# Patient Record
Sex: FEMALE | Race: WHITE | NOT HISPANIC OR LATINO | Employment: OTHER | URBAN - METROPOLITAN AREA
[De-identification: names, ages, dates, MRNs, and addresses within clinical notes are randomized per-mention and may not be internally consistent; named-entity substitution may affect disease eponyms.]

---

## 2018-12-06 ENCOUNTER — OFFICE VISIT CONVERTED (OUTPATIENT)
Dept: ORTHOPEDIC SURGERY | Facility: CLINIC | Age: 68
End: 2018-12-06
Attending: ORTHOPAEDIC SURGERY

## 2019-07-19 ENCOUNTER — HOSPITAL ENCOUNTER (OUTPATIENT)
Dept: URGENT CARE | Facility: CLINIC | Age: 69
Discharge: HOME | End: 2019-07-19
Attending: EMERGENCY MEDICINE

## 2019-12-05 ENCOUNTER — OFFICE VISIT CONVERTED (OUTPATIENT)
Dept: CARDIOLOGY | Facility: CLINIC | Age: 69
End: 2019-12-05
Attending: INTERNAL MEDICINE

## 2019-12-05 ENCOUNTER — CONVERSION ENCOUNTER (OUTPATIENT)
Dept: CARDIOLOGY | Facility: CLINIC | Age: 69
End: 2019-12-05

## 2019-12-09 ENCOUNTER — HOSPITAL ENCOUNTER (OUTPATIENT)
Dept: INFUSION THERAPY | Facility: HOSPITAL | Age: 69
Setting detail: HOSPITAL OUTPATIENT SURGERY
Discharge: HOME | End: 2019-12-09
Attending: INTERNAL MEDICINE

## 2019-12-09 LAB
ANION GAP SERPL CALC-SCNC: 15 MMOL/L (ref 8–19)
BASOPHILS # BLD AUTO: 0.06 10*3/UL (ref 0–0.2)
BASOPHILS NFR BLD AUTO: 1.4 % (ref 0–3)
BUN SERPL-MCNC: 12 MG/DL (ref 5–25)
BUN/CREAT SERPL: 17 {RATIO} (ref 6–20)
CALCIUM SERPL-MCNC: 9.8 MG/DL (ref 8.7–10.4)
CHLORIDE SERPL-SCNC: 105 MMOL/L (ref 99–111)
CONV ABS IMM GRAN: 0.01 10*3/UL (ref 0–0.2)
CONV CO2: 27 MMOL/L (ref 22–32)
CONV IMMATURE GRAN: 0.2 % (ref 0–1.8)
CREAT UR-MCNC: 0.72 MG/DL (ref 0.5–0.9)
DEPRECATED RDW RBC AUTO: 40 FL (ref 36.4–46.3)
EOSINOPHIL # BLD AUTO: 0.18 10*3/UL (ref 0–0.7)
EOSINOPHIL # BLD AUTO: 4.1 % (ref 0–7)
ERYTHROCYTE [DISTWIDTH] IN BLOOD BY AUTOMATED COUNT: 12.1 % (ref 11.7–14.4)
GFR SERPLBLD BASED ON 1.73 SQ M-ARVRAT: >60 ML/MIN/{1.73_M2}
GLUCOSE SERPL-MCNC: 122 MG/DL (ref 65–99)
HCT VFR BLD AUTO: 39.9 % (ref 37–47)
HGB BLD-MCNC: 12.7 G/DL (ref 12–16)
INR PPP: 1 (ref 2–3)
LYMPHOCYTES # BLD AUTO: 1.1 10*3/UL (ref 1–5)
LYMPHOCYTES NFR BLD AUTO: 25 % (ref 20–45)
MCH RBC QN AUTO: 28.9 PG (ref 27–31)
MCHC RBC AUTO-ENTMCNC: 31.8 G/DL (ref 33–37)
MCV RBC AUTO: 90.7 FL (ref 81–99)
MONOCYTES # BLD AUTO: 0.45 10*3/UL (ref 0.2–1.2)
MONOCYTES NFR BLD AUTO: 10.2 % (ref 3–10)
NEUTROPHILS # BLD AUTO: 2.6 10*3/UL (ref 2–8)
NEUTROPHILS NFR BLD AUTO: 59.1 % (ref 30–85)
NRBC CBCN: 0 % (ref 0–0.7)
OSMOLALITY SERPL CALC.SUM OF ELEC: 297 MOSM/KG (ref 273–304)
PLATELET # BLD AUTO: 314 10*3/UL (ref 130–400)
PMV BLD AUTO: 9.5 FL (ref 9.4–12.3)
POTASSIUM SERPL-SCNC: 3.7 MMOL/L (ref 3.5–5.3)
PROTHROMBIN TIME: 10.8 S (ref 9.4–12)
RBC # BLD AUTO: 4.4 10*6/UL (ref 4.2–5.4)
SODIUM SERPL-SCNC: 143 MMOL/L (ref 135–147)
WBC # BLD AUTO: 4.4 10*3/UL (ref 4.8–10.8)

## 2019-12-10 LAB
BASE EXCESS BLD CALC-SCNC: -2.7 MMOL/L
BASE EXCESS BLD CALC-SCNC: -5 MMOL/L
COHGB MFR BLD: 0.1 % (ref 0–1.5)
COHGB MFR BLD: 0.3 % (ref 0–1.5)
CONV FHHB: 21.4 % (ref 0–5)
CONV FHHB: 22.4 % (ref 0–5)
CONV SITE: ABNORMAL
CONV SITE: ABNORMAL
HBA1C MFR BLD: 12.6 % (ref 11.7–14.6)
HBA1C MFR BLD: 13.1 % (ref 11.7–14.6)
HCO3 BLDA-SCNC: 21.1 MMOL/L (ref 22–26)
HCO3 BLDA-SCNC: 23.5 MMOL/L (ref 22–26)
LITERS PER MINUTE: 2 L/MIN
LITERS PER MINUTE: 2 L/MIN
Lab: ABNORMAL
Lab: ABNORMAL
METHGB MFR BLD: 0.1 % (ref 0–1.5)
METHGB MFR BLD: 0.2 % (ref 0–1.5)
OXYHGB MFR BLD: 77.2 % (ref 94–98)
OXYHGB MFR BLD: 78.3 % (ref 94–98)
PCO2 BLD: 42.7 MM[HG] (ref 35–45)
PCO2 BLD: 46.1 MM[HG] (ref 35–45)
PH UR: 7.31 [PH] (ref 7.35–7.45)
PH UR: 7.32 [PH] (ref 7.35–7.45)
PO2 BLD: 44.9 MM[HG] (ref 80–100)
PO2 BLD: 45 MM[HG] (ref 80–100)
SAO2 % BLDCOA: 77.5 % (ref 95–99)
SAO2 % BLDCOA: 78.5 % (ref 95–99)
SPECIMEN SOURCE: ABNORMAL
SPECIMEN SOURCE: ABNORMAL

## 2020-02-17 ENCOUNTER — HOSPITAL ENCOUNTER (OUTPATIENT)
Dept: OTHER | Facility: HOSPITAL | Age: 70
Discharge: HOME | End: 2020-02-17
Attending: FAMILY MEDICINE

## 2020-11-24 ENCOUNTER — HOSPITAL ENCOUNTER (OUTPATIENT)
Dept: OTHER | Facility: HOSPITAL | Age: 70
Discharge: HOME | End: 2020-11-24
Attending: INTERNAL MEDICINE

## 2020-11-24 LAB
T4 FREE SERPL-MCNC: 0.6 NG/DL (ref 0.9–1.8)
TSH SERPL-ACNC: 10.78 M[IU]/L (ref 0.27–4.2)

## 2020-11-25 LAB — VIT B12 SERPL-MCNC: 522 PG/ML (ref 211–911)

## 2021-04-06 ENCOUNTER — OFFICE VISIT CONVERTED (OUTPATIENT)
Dept: PODIATRY | Facility: CLINIC | Age: 71
End: 2021-04-06
Attending: PODIATRIST

## 2021-04-29 ENCOUNTER — HOSPITAL ENCOUNTER (OUTPATIENT)
Dept: MRI IMAGING | Facility: HOSPITAL | Age: 71
Discharge: HOME | End: 2021-04-29
Attending: PODIATRIST

## 2021-04-29 LAB
CREAT BLD-MCNC: 0.7 MG/DL (ref 0.6–1.4)
GFR SERPLBLD BASED ON 1.73 SQ M-ARVRAT: >60 ML/MIN/{1.73_M2}

## 2021-05-14 VITALS
DIASTOLIC BLOOD PRESSURE: 75 MMHG | HEIGHT: 63 IN | SYSTOLIC BLOOD PRESSURE: 142 MMHG | BODY MASS INDEX: 28.17 KG/M2 | OXYGEN SATURATION: 94 % | TEMPERATURE: 97.6 F | HEART RATE: 66 BPM | WEIGHT: 159 LBS

## 2021-05-14 NOTE — PROGRESS NOTES
Progress Note      Patient Name: Gregoria Claire   Patient ID: [id number]   Sex: Female   YOB: 1950    Primary Care Provider: Waldo Stark MD   Referring Provider: Waldo Stark MD    Visit Date: April 6, 2021    Provider: Antonio Borjas DPM   [place] [facility]   Location Address: [address] [place] [address]  [id number]   Location Phone: [phone]          Chief Complaint  · Left Foot Pain  · Right Foot Pain      History Of Present Illness  Gregoria Claire is a 70 year old /White female who presents to the [facility] today new patient referred from Waldo Stark MD.      New, Established, New Problem: New  Location: Left second and third intermetatarsal spaces along with a right second toe overlapping hammertoe.  Duration: Greater than 1 year  Onset: Insidious  Nature: Sore, shooting  Stable, worsening, improving: Worsening  Aggravating factors:  Patient relates pain is aggravated by shoe gear and ambulation.    Previous Treatment: Patient relates multiple neuroma surgeries on both feet along with a right second hammertoe surgery.    Patient denies any fevers, chills, nausea, vomiting, shortness of breathe, nor any other constitutional signs nor symptoms.           Past Medical History  Corns and callus; Hammertoe; History of breast cancer; Trochanteric Bursitis, Bilateral Hips         Past Surgical History  Breast Surgery; Foot surgery; Tumor removal         Medication List  diclofenac sodium 75 mg oral tablet,delayed release (DR/EC); Effexor XR 37.5 mg oral capsule,extended release 24hr; levothyroxine 125 mcg oral capsule         Allergy List  NO KNOWN DRUG ALLERGIES         Family Medical History  Cancer, Unspecified; FH: lung cancer; FH: stomach cancer; FH: diabetes mellitus; FH: hepatitis         Social History  Alcohol (Never); Tobacco (Former)         Review of Systems  · Constitutional  o Denies  o : fatigue, night  "sweats  · Eyes  o Denies  o : double vision, blurred vision  · HENT  o Denies  o : vertigo, recent head injury  · Cardiovascular  o Denies  o : chest pain, irregular heart beats  · Respiratory  o Denies  o : shortness of breath, productive cough  · Gastrointestinal  o Denies  o : nausea, vomiting  · Genitourinary  o Denies  o : dysuria, urinary retention  · Integument  o Denies  o : hair growth change, new skin lesions  · Neurologic  o * See HPI  · Musculoskeletal  o * See HPI  · Endocrine  o Denies  o : cold intolerance, heat intolerance  · Heme-Lymph  o Denies  o : petechiae, lymph node enlargement or tenderness  · Allergic-Immunologic  o Denies  o : frequent illnesses      Vitals  Date Time BP Position Site L\R Cuff Size HR RR TEMP (F) WT  HT  BMI kg/m2 BSA m2 O2 Sat FR L/min FiO2 HC       04/06/2021 10:55 /75 Sitting    66 - R  97.6 159lbs 0oz 5'  3\" 28.17 1.79 94 %            Physical Examination  · Constitutional  o Appearance  o : well-nourished, well developed, appears to be chronically ill   · Cardiovascular  o Peripheral Vascular System  o :   § Pedal Pulses  § : 2+ and symmetrical   § Extremities  § : There is no edema of the lower extremities  · Musculoskeletal  o Extremeties/Joint  o : Lower extremity muscle strength and range of motion is equal and symmetrical bilaterally. The knees are noted to be in normal alignment. Right second overlapping second toe over the hallux. Otherwise, ankle alignment and range of motion is normal and foot structure is normal. Subtalar, metatarsal and metatarsal-phalangeal joint range of motion is noted to be within normal limits. The digits of both feet are in normal alignment. The gait is normal.   · Skin and Subcutaneous Tissue  o General Inspection  o : Skin is noted to have normal texture and turgor, with no excrescences noted.   o Digits and Nails  o : The toenails are noted to be without disese.  · Neurologic  o Sensation  o : There is pain on palpation at the " level of Tinels sign with Mulders sign to the left second and third intermetatarsal spaces. Well-healed incisions from previous neuroma surgeries.          Assessment  · Foot pain, left     729.5/M79.672  · Foot pain, right     729.5/M79.671  · Neuroma     215.9/D36.10  · Neuritis     729.2/M79.2      Plan  · Orders  o Lower Extremity (non-joint) with and without contrast (MRI) (63009) - - 04/29/2021  · Medications  o Medications have been Reconciled  o Transition of Care or Provider Policy  · Instructions  o Discuss Findings: I have discussed the findings of this evaluation with the patient. The discussion included a complete verbal explanation of any changes in the examination results, diagnosis, and the current treatment plan. A schedule for future care needs was explained. If any questions should arise after returning home, I have encouraged the patient to feel free to contact Dr. Borjas. The patient states understanding and agreement with this plan.  o Follow-up will be scheduled after MRI results are available. Depending on MRI results may refer to Dr. Haile Uriostegui.  o Patient to monitor for recurrence of symptoms and to contact Dr. Walker office for a follow-up appointment.  o Electronically Identified Patient Education Materials Provided Electronically  · Disposition  o Call or Return if symptoms worsen or persist.            Electronically Signed by: Antonio Borjas DPM -Author on April 29, 2021 02:23:40 PM

## 2021-05-15 VITALS
DIASTOLIC BLOOD PRESSURE: 76 MMHG | HEIGHT: 63 IN | WEIGHT: 155 LBS | HEART RATE: 54 BPM | SYSTOLIC BLOOD PRESSURE: 142 MMHG | BODY MASS INDEX: 27.46 KG/M2

## 2021-05-15 VITALS — HEIGHT: 63 IN

## 2021-08-11 ENCOUNTER — TRANSCRIBE ORDERS (OUTPATIENT)
Dept: ADMINISTRATIVE | Facility: HOSPITAL | Age: 71
End: 2021-08-11

## 2021-08-11 DIAGNOSIS — Z12.31 SCREENING MAMMOGRAM, ENCOUNTER FOR: Primary | ICD-10-CM

## 2022-03-08 ENCOUNTER — TRANSCRIBE ORDERS (OUTPATIENT)
Dept: ADMINISTRATIVE | Facility: HOSPITAL | Age: 72
End: 2022-03-08

## 2022-03-08 DIAGNOSIS — Z12.31 SCREENING MAMMOGRAM FOR BREAST CANCER: Primary | ICD-10-CM

## 2022-04-05 ENCOUNTER — APPOINTMENT (OUTPATIENT)
Dept: MAMMOGRAPHY | Facility: HOSPITAL | Age: 72
End: 2022-04-05

## 2022-04-14 ENCOUNTER — OFFICE VISIT (OUTPATIENT)
Dept: INTERNAL MEDICINE | Facility: CLINIC | Age: 72
End: 2022-04-14

## 2022-04-14 VITALS
DIASTOLIC BLOOD PRESSURE: 65 MMHG | RESPIRATION RATE: 14 BRPM | OXYGEN SATURATION: 96 % | HEIGHT: 62 IN | BODY MASS INDEX: 29.81 KG/M2 | WEIGHT: 162 LBS | TEMPERATURE: 96.4 F | SYSTOLIC BLOOD PRESSURE: 131 MMHG | HEART RATE: 69 BPM

## 2022-04-14 DIAGNOSIS — I10 PRIMARY HYPERTENSION: ICD-10-CM

## 2022-04-14 DIAGNOSIS — N30.00 ACUTE CYSTITIS WITHOUT HEMATURIA: ICD-10-CM

## 2022-04-14 DIAGNOSIS — Z12.31 ENCOUNTER FOR SCREENING MAMMOGRAM FOR MALIGNANT NEOPLASM OF BREAST: ICD-10-CM

## 2022-04-14 DIAGNOSIS — Z76.89 ESTABLISHING CARE WITH NEW DOCTOR, ENCOUNTER FOR: Primary | ICD-10-CM

## 2022-04-14 DIAGNOSIS — R73.01 IMPAIRED FASTING GLUCOSE: ICD-10-CM

## 2022-04-14 DIAGNOSIS — E03.9 HYPOTHYROIDISM, UNSPECIFIED TYPE: ICD-10-CM

## 2022-04-14 DIAGNOSIS — R30.0 DYSURIA: ICD-10-CM

## 2022-04-14 DIAGNOSIS — R00.2 PALPITATIONS: ICD-10-CM

## 2022-04-14 DIAGNOSIS — Z13.220 LIPID SCREENING: ICD-10-CM

## 2022-04-14 PROBLEM — Z85.3 HISTORY OF BREAST CANCER: Status: ACTIVE | Noted: 2022-04-14

## 2022-04-14 LAB
ALBUMIN SERPL-MCNC: 4.6 G/DL (ref 3.5–5.2)
ALBUMIN/GLOB SERPL: 1.6 G/DL
ALP SERPL-CCNC: 42 U/L (ref 39–117)
ALT SERPL W P-5'-P-CCNC: 14 U/L (ref 1–33)
ANION GAP SERPL CALCULATED.3IONS-SCNC: 10.9 MMOL/L (ref 5–15)
AST SERPL-CCNC: 15 U/L (ref 1–32)
BACTERIA UR QL AUTO: ABNORMAL /HPF
BASOPHILS # BLD AUTO: 0.05 10*3/MM3 (ref 0–0.2)
BASOPHILS NFR BLD AUTO: 0.9 % (ref 0–1.5)
BILIRUB SERPL-MCNC: 0.3 MG/DL (ref 0–1.2)
BILIRUB UR QL STRIP: ABNORMAL
BUN SERPL-MCNC: 10 MG/DL (ref 8–23)
BUN/CREAT SERPL: 13.9 (ref 7–25)
CALCIUM SPEC-SCNC: 9.9 MG/DL (ref 8.6–10.5)
CHLORIDE SERPL-SCNC: 103 MMOL/L (ref 98–107)
CHOLEST SERPL-MCNC: 182 MG/DL (ref 0–200)
CLARITY UR: ABNORMAL
CO2 SERPL-SCNC: 25.1 MMOL/L (ref 22–29)
COLOR UR: ABNORMAL
CREAT SERPL-MCNC: 0.72 MG/DL (ref 0.57–1)
DEPRECATED RDW RBC AUTO: 39.7 FL (ref 37–54)
EGFRCR SERPLBLD CKD-EPI 2021: 89.5 ML/MIN/1.73
EOSINOPHIL # BLD AUTO: 0.13 10*3/MM3 (ref 0–0.4)
EOSINOPHIL NFR BLD AUTO: 2.2 % (ref 0.3–6.2)
ERYTHROCYTE [DISTWIDTH] IN BLOOD BY AUTOMATED COUNT: 12.7 % (ref 12.3–15.4)
GLOBULIN UR ELPH-MCNC: 2.9 GM/DL
GLUCOSE SERPL-MCNC: 115 MG/DL (ref 65–99)
GLUCOSE UR STRIP-MCNC: NEGATIVE MG/DL
HBA1C MFR BLD: 5.6 % (ref 4.8–5.6)
HCT VFR BLD AUTO: 41.1 % (ref 34–46.6)
HDLC SERPL-MCNC: 53 MG/DL (ref 40–60)
HGB BLD-MCNC: 13.6 G/DL (ref 12–15.9)
HGB UR QL STRIP.AUTO: ABNORMAL
IMM GRANULOCYTES # BLD AUTO: 0.02 10*3/MM3 (ref 0–0.05)
IMM GRANULOCYTES NFR BLD AUTO: 0.3 % (ref 0–0.5)
KETONES UR QL STRIP: ABNORMAL
LDLC SERPL CALC-MCNC: 116 MG/DL (ref 0–100)
LDLC/HDLC SERPL: 2.17 {RATIO}
LEUKOCYTE ESTERASE UR QL STRIP.AUTO: ABNORMAL
LYMPHOCYTES # BLD AUTO: 0.87 10*3/MM3 (ref 0.7–3.1)
LYMPHOCYTES NFR BLD AUTO: 14.8 % (ref 19.6–45.3)
MCH RBC QN AUTO: 29.2 PG (ref 26.6–33)
MCHC RBC AUTO-ENTMCNC: 33.1 G/DL (ref 31.5–35.7)
MCV RBC AUTO: 88.2 FL (ref 79–97)
MONOCYTES # BLD AUTO: 0.52 10*3/MM3 (ref 0.1–0.9)
MONOCYTES NFR BLD AUTO: 8.9 % (ref 5–12)
NEUTROPHILS NFR BLD AUTO: 4.28 10*3/MM3 (ref 1.7–7)
NEUTROPHILS NFR BLD AUTO: 72.9 % (ref 42.7–76)
NITRITE UR QL STRIP: NEGATIVE
NRBC BLD AUTO-RTO: 0 /100 WBC (ref 0–0.2)
PH UR STRIP.AUTO: 5.5 [PH] (ref 5–8)
PLATELET # BLD AUTO: 369 10*3/MM3 (ref 140–450)
PMV BLD AUTO: 10 FL (ref 6–12)
POTASSIUM SERPL-SCNC: 4.1 MMOL/L (ref 3.5–5.2)
PROT SERPL-MCNC: 7.5 G/DL (ref 6–8.5)
PROT UR QL STRIP: ABNORMAL
RBC # BLD AUTO: 4.66 10*6/MM3 (ref 3.77–5.28)
RBC # UR STRIP: ABNORMAL /HPF
REF LAB TEST METHOD: ABNORMAL
SODIUM SERPL-SCNC: 139 MMOL/L (ref 136–145)
SP GR UR STRIP: >=1.03 (ref 1–1.03)
SQUAMOUS #/AREA URNS HPF: ABNORMAL /HPF
T4 FREE SERPL-MCNC: 2.15 NG/DL (ref 0.93–1.7)
TRIGL SERPL-MCNC: 71 MG/DL (ref 0–150)
TSH SERPL DL<=0.05 MIU/L-ACNC: 0.31 UIU/ML (ref 0.27–4.2)
UROBILINOGEN UR QL STRIP: ABNORMAL
VLDLC SERPL-MCNC: 13 MG/DL (ref 5–40)
WBC # UR STRIP: ABNORMAL /HPF
WBC NRBC COR # BLD: 5.87 10*3/MM3 (ref 3.4–10.8)
YEAST URNS QL MICRO: ABNORMAL /HPF

## 2022-04-14 PROCEDURE — 84443 ASSAY THYROID STIM HORMONE: CPT | Performed by: NURSE PRACTITIONER

## 2022-04-14 PROCEDURE — 84439 ASSAY OF FREE THYROXINE: CPT | Performed by: NURSE PRACTITIONER

## 2022-04-14 PROCEDURE — 80061 LIPID PANEL: CPT | Performed by: NURSE PRACTITIONER

## 2022-04-14 PROCEDURE — 81001 URINALYSIS AUTO W/SCOPE: CPT | Performed by: NURSE PRACTITIONER

## 2022-04-14 PROCEDURE — 87088 URINE BACTERIA CULTURE: CPT | Performed by: NURSE PRACTITIONER

## 2022-04-14 PROCEDURE — 83036 HEMOGLOBIN GLYCOSYLATED A1C: CPT | Performed by: NURSE PRACTITIONER

## 2022-04-14 PROCEDURE — 85025 COMPLETE CBC W/AUTO DIFF WBC: CPT | Performed by: NURSE PRACTITIONER

## 2022-04-14 PROCEDURE — 87186 SC STD MICRODIL/AGAR DIL: CPT | Performed by: NURSE PRACTITIONER

## 2022-04-14 PROCEDURE — 80053 COMPREHEN METABOLIC PANEL: CPT | Performed by: NURSE PRACTITIONER

## 2022-04-14 PROCEDURE — 87086 URINE CULTURE/COLONY COUNT: CPT | Performed by: NURSE PRACTITIONER

## 2022-04-14 PROCEDURE — 99204 OFFICE O/P NEW MOD 45 MIN: CPT | Performed by: NURSE PRACTITIONER

## 2022-04-14 RX ORDER — VENLAFAXINE HYDROCHLORIDE 225 MG/1
TABLET, EXTENDED RELEASE ORAL
COMMUNITY
Start: 2022-03-08 | End: 2022-04-25

## 2022-04-14 RX ORDER — LEVOTHYROXINE SODIUM 125 UG/1
CAPSULE ORAL
COMMUNITY
End: 2022-04-14

## 2022-04-14 RX ORDER — LAMOTRIGINE 150 MG/1
75 TABLET ORAL DAILY
COMMUNITY
Start: 2022-03-08 | End: 2022-10-18 | Stop reason: SDUPTHER

## 2022-04-14 RX ORDER — OXYBUTYNIN CHLORIDE 5 MG/1
5 TABLET ORAL 2 TIMES DAILY
Qty: 60 TABLET | Refills: 1 | Status: SHIPPED | OUTPATIENT
Start: 2022-04-14 | End: 2022-10-18 | Stop reason: SDUPTHER

## 2022-04-14 RX ORDER — LISINOPRIL 20 MG/1
TABLET ORAL
COMMUNITY
Start: 2022-03-08 | End: 2022-04-14

## 2022-04-14 RX ORDER — LEVOTHYROXINE SODIUM 0.12 MG/1
125 TABLET ORAL DAILY
COMMUNITY
Start: 2022-03-09 | End: 2022-06-11 | Stop reason: HOSPADM

## 2022-04-14 RX ORDER — VENLAFAXINE HYDROCHLORIDE 37.5 MG/1
37.5 CAPSULE, EXTENDED RELEASE ORAL DAILY
COMMUNITY
End: 2022-06-16 | Stop reason: SDUPTHER

## 2022-04-14 RX ORDER — NITROFURANTOIN 25; 75 MG/1; MG/1
100 CAPSULE ORAL 2 TIMES DAILY
Qty: 10 CAPSULE | Refills: 0 | Status: SHIPPED | OUTPATIENT
Start: 2022-04-14 | End: 2022-04-19

## 2022-04-14 NOTE — ASSESSMENT & PLAN NOTE
We will go ahead and refer back to cardiology, it has been 3 years since her last work-up.  We will follow along and assist as needed.

## 2022-04-14 NOTE — PROGRESS NOTES
Chief Complaint  Establish Care and Dysuria    Subjective         Gregoria Claire presents to [facility] for Establishment of care, follow-up for chronic conditions, concerns about palpitations and dysuria.    Patient transferring care from Dr. Stark in [facility], patient is wanting to be closer to home, she states that which difficult making the drive to [place] for her appointments.  Patient was not dissatisfied with care, just tired of the drive.  She had her last follow-up a few months ago, she reported that everything at that time was going well.    Patient does have hypertension, currently diet and exercise controlled.  She has been on medications in the past, but not presently on 1.  She was just slightly elevated today, however she reports good readings at home.    Patient also with hypothyroidism, does take levothyroxine 125 mcg daily, her dose was altered a few months ago, she has not had any repeat labs to assure normalization.  Would like that done today.    Patient also with depression, this has been a longstanding issue.  She has been on several different medications currently treated with lamotrigine and venlafaxine, which she states works well.    Patient is wanting lab work as well as order for mammogram as it is time for her to have that completed.    Patient has acute concerns today of urinary frequency, urgency, dysuria.  The symptoms have been going on for the last few days.  Patient does have longstanding issue of urinary incontinence, she did want to discuss treatment options for that.    Also acute concern of palpitations, she did have a very thorough work-up done in 2019 by Dr. Kulwinder Zuniga, there was some mild aortic tricuspid regurgitation noted, otherwise no significant findings.  She had been trialed on Toprol and Cardizem, but patient states that these have never really gotten any better.  She is wanting to reestablish with cardiology to further  "discuss.             Review of Systems    Objective   Vital Signs:   /65   Pulse 69   Temp 96.4 °F (35.8 °C) (Temporal)   Resp 14   Ht 157.5 cm (62\")   Wt 73.5 kg (162 lb)   SpO2 96%   BMI 29.63 kg/m²     Physical Exam  Vitals and nursing note reviewed.   Constitutional:       Appearance: Normal appearance.   HENT:      Head: Normocephalic and atraumatic.      Nose: Nose normal.   Eyes:      Pupils: Pupils are equal, round, and reactive to light.   Cardiovascular:      Rate and Rhythm: Normal rate and regular rhythm.   Pulmonary:      Effort: Pulmonary effort is normal.      Breath sounds: Normal breath sounds.   Neurological:      Mental Status: She is alert.   Psychiatric:         Mood and Affect: Mood normal.         Thought Content: Thought content normal.        Result Review :                   Diagnoses and all orders for this visit:    1. Establishing care with new doctor, encounter for (Primary)    2. Primary hypertension  Assessment & Plan:  Currently well controlled with no medication.  We will continue to monitor.    Orders:  -     Comprehensive Metabolic Panel  -     CBC & Differential    3. Hypothyroidism, unspecified type  Assessment & Plan:  We will check labs today, adjust dosing as needed.    Orders:  -     T4, Free  -     TSH    4. Lipid screening  -     Lipid Panel    5. Encounter for screening mammogram for malignant neoplasm of breast  -     Mammo Screening Digital Tomosynthesis Bilateral With CAD; Future    6. Palpitations  Assessment & Plan:  We will go ahead and refer back to cardiology, it has been 3 years since her last work-up.  We will follow along and assist as needed.    Orders:  -     Ambulatory Referral to Cardiology    7. Impaired fasting glucose  -     Hemoglobin A1c    8. Dysuria  -     Urinalysis With Culture If Indicated - Urine, Clean Catch  -     Urinalysis, Microscopic Only - Urine, Clean Catch  -     Urine Culture - Urine, Urine, Clean Catch    9. Acute cystitis " without hematuria  Assessment & Plan:  We will go ahead and treat with Macrobid, will culture urine, follow-up based on results.      Other orders  -     nitrofurantoin, macrocrystal-monohydrate, (MACROBID) 100 MG capsule; Take 1 capsule by mouth 2 (Two) Times a Day for 5 days.  Dispense: 10 capsule; Refill: 0  -     oxybutynin (DITROPAN) 5 MG tablet; Take 1 tablet by mouth 2 (Two) Times a Day. May cause dry mouth  Dispense: 60 tablet; Refill: 1        Follow Up   Return in about 3 months (around 7/14/2022) for Recheck.  Patient was given instructions and counseling regarding her condition or for health maintenance advice. Please see specific information pulled into the AVS if appropriate.     Patient when exiting the clinic after the visit did suffer a fall, she tripped on the curb, falling forward hitting her knees on the concrete and catching herself with her hands.  Patient did scrape the palmar surface of both hands and both knees.  Patient did not appear to hit her head.  Staff was able to assist her, they did bring her back into exam room where I saw the patient.  She did have 2 small open wounds to her left hand, dorsal surface.  She did have abrasions noted to bilateral palmar surfaces.  Both knees with abrasions.  She complains of right-sided musculoskeletal thoracic pain.  There was no acute injury seen, I was accompanied by Rula Gallagher, practice manager also acted as chaperone.  Patient states that she feels fine, she does not feel like she needs any x-rays or further work-up.  Rula was able to cleanse the wounds and apply Band-Aids.  The event has been documented.  If patient has any further issues or concerns advised to let us know, we will be happy to help in any way.  She was discharged in stable condition.    IAN Thomson  4/14/2022  This note was electronically signed.

## 2022-04-16 LAB — BACTERIA SPEC AEROBE CULT: ABNORMAL

## 2022-04-20 ENCOUNTER — APPOINTMENT (OUTPATIENT)
Dept: MAMMOGRAPHY | Facility: HOSPITAL | Age: 72
End: 2022-04-20

## 2022-04-25 ENCOUNTER — OFFICE VISIT (OUTPATIENT)
Dept: CARDIOLOGY | Facility: CLINIC | Age: 72
End: 2022-04-25

## 2022-04-25 VITALS
DIASTOLIC BLOOD PRESSURE: 96 MMHG | HEIGHT: 62 IN | SYSTOLIC BLOOD PRESSURE: 158 MMHG | HEART RATE: 63 BPM | WEIGHT: 160 LBS | BODY MASS INDEX: 29.44 KG/M2

## 2022-04-25 DIAGNOSIS — I10 PRIMARY HYPERTENSION: ICD-10-CM

## 2022-04-25 DIAGNOSIS — R06.09 DOE (DYSPNEA ON EXERTION): ICD-10-CM

## 2022-04-25 DIAGNOSIS — R00.2 PALPITATIONS: Primary | ICD-10-CM

## 2022-04-25 DIAGNOSIS — R42 LIGHTHEADEDNESS: ICD-10-CM

## 2022-04-25 PROCEDURE — 99214 OFFICE O/P EST MOD 30 MIN: CPT | Performed by: NURSE PRACTITIONER

## 2022-04-25 PROCEDURE — 93000 ELECTROCARDIOGRAM COMPLETE: CPT | Performed by: NURSE PRACTITIONER

## 2022-04-25 RX ORDER — TRAZODONE HYDROCHLORIDE 150 MG/1
75 TABLET ORAL NIGHTLY
COMMUNITY
End: 2022-06-16

## 2022-04-25 RX ORDER — LOVASTATIN 20 MG/1
20 TABLET ORAL
Qty: 90 TABLET | Refills: 1 | Status: SHIPPED | OUTPATIENT
Start: 2022-04-25

## 2022-04-25 RX ORDER — LISINOPRIL 2.5 MG/1
2.5 TABLET ORAL DAILY
Qty: 90 TABLET | Refills: 1 | Status: SHIPPED | OUTPATIENT
Start: 2022-04-25 | End: 2022-06-11 | Stop reason: HOSPADM

## 2022-04-25 NOTE — PROGRESS NOTES
"Chief Complaint  Shortness of Breath, Palpitations, Dizziness, and Fatigue    Subjective            History of Present Illness  Gregoria Claire is a 71-year-old white/ female patient who presents to the office today with complaint of increased palpitations, dizziness, fatigue, and shortness of breath with physical exertion.  She admits that this has been ongoing for the past 2 to 3 years but significantly worsened over the last few weeks.  She has noticed that she becomes more labored in her breathing with physical activity and is sleeping more during the daytime.  At times of more labored breathing is when she most notices the heart palpitations occurring.  She reports that she has history of \"valve regurgitation\".  She also reports history of syncope \"3 hospitalizations from passing out but 1 of those was because I had a UTI\".  She is not currently prescribed any medication for cholesterol, blood pressure, or heart rate control.  Most recent records have been requested from previous cardiologist.  Review of her chart shows 12/9/2019 cardiac catheterization with normal angiographically appearing coronary arteries, Echocardiogram on 5/10/2019 shows mild tricuspid regurgitation and ejection fraction 65%, and Holter monitor on 5/9/2019 shows occasional PACs and frequent PVCs with a few short runs of SVT.    PMH  Past Medical History:   Diagnosis Date   • Breast cancer (HCC)    • Depression    • Hypertension    • Hyperthyroidism          ALLERGY  No Known Allergies       SURGICALHX  Past Surgical History:   Procedure Laterality Date   • HYSTERECTOMY     • MASTECTOMY            SOC  Social History     Socioeconomic History   • Marital status:    Tobacco Use   • Smoking status: Never Smoker   • Smokeless tobacco: Never Used   Vaping Use   • Vaping Use: Never used   Substance and Sexual Activity   • Alcohol use: Never   • Drug use: Never   • Sexual activity: Defer         FAMHX  History reviewed. No " "pertinent family history.       MEDSIGONLY  Current Outpatient Medications on File Prior to Visit   Medication Sig   • lamoTRIgine (LaMICtal) 150 MG tablet Take  by mouth Daily. 1/2 tab qd   • levothyroxine (SYNTHROID, LEVOTHROID) 125 MCG tablet Take 125 mcg by mouth Daily.   • oxybutynin (DITROPAN) 5 MG tablet Take 1 tablet by mouth 2 (Two) Times a Day. May cause dry mouth   • traZODone (DESYREL) 75 MG half tablet Take 75 mg by mouth Every Night.   • venlafaxine XR (EFFEXOR-XR) 37.5 MG 24 hr capsule Effexor XR 37.5 mg oral capsule,extended release 24hr take 1 capsule (37.5 mg) by oral route once daily with food   Active   • [DISCONTINUED] venlafaxine 225 MG tablet sustained-release 24 hour 24 hr tablet      No current facility-administered medications on file prior to visit.         Objective   /96 (BP Location: Right arm, Patient Position: Sitting)   Pulse 63   Ht 157.5 cm (62\")   Wt 72.6 kg (160 lb)   BMI 29.26 kg/m²       Physical Exam  HENT:      Head: Normocephalic.   Neck:      Vascular: No carotid bruit.   Cardiovascular:      Rate and Rhythm: Normal rate and regular rhythm.      Pulses: Normal pulses.      Heart sounds: Murmur heard.   Pulmonary:      Effort: Pulmonary effort is normal.      Breath sounds: Normal breath sounds.   Musculoskeletal:      Cervical back: Neck supple.      Right lower leg: No edema.      Left lower leg: No edema.   Skin:     General: Skin is dry.      Capillary Refill: Capillary refill takes less than 2 seconds.   Neurological:      Mental Status: She is alert and oriented to person, place, and time.   Psychiatric:         Behavior: Behavior normal.       ECG 12 Lead    Date/Time: 4/25/2022 2:29 PM  Performed by: Lucila Stark APRN  Authorized by: Manfred Escalona MD   Comparison: compared with previous ECG   Similar to previous ECG  Rhythm: sinus rhythm  Rate: bradycardic  BPM: 59  Conduction: conduction normal  ST Segments: ST segments normal  T Waves: T " waves normal  QRS axis: normal  Other findings: T wave abnormality    Clinical impression: normal ECG          Result Review :   The following data was reviewed by: IAN Giordano on 04/25/2022:  No results found for: PROBNP  CMP    CMP 4/14/22   Glucose 115 (A)   BUN 10   Creatinine 0.72   Sodium 139   Potassium 4.1   Chloride 103   Calcium 9.9   Albumin 4.60   Total Bilirubin 0.3   Alkaline Phosphatase 42   AST (SGOT) 15   ALT (SGPT) 14   (A) Abnormal value            CBC w/diff    CBC w/Diff 4/14/22   WBC 5.87   RBC 4.66   Hemoglobin 13.6   Hematocrit 41.1   MCV 88.2   MCH 29.2   MCHC 33.1   RDW 12.7   Platelets 369   Neutrophil Rel % 72.9   Immature Granulocyte Rel % 0.3   Lymphocyte Rel % 14.8 (A)   Monocyte Rel % 8.9   Eosinophil Rel % 2.2   Basophil Rel % 0.9   (A) Abnormal value             Lab Results   Component Value Date    TSH 0.306 04/14/2022      Lab Results   Component Value Date    FREET4 2.15 (H) 04/14/2022      No results found for: DDIMERQUANT  No results found for: MG   No results found for: DIGOXIN   Lab Results   Component Value Date    TROPONINT <0.01 07/19/2019           Lipid Panel    Lipid Panel 4/14/22   Total Cholesterol 182   Triglycerides 71   HDL Cholesterol 53   VLDL Cholesterol 13   LDL Cholesterol  116 (A)   LDL/HDL Ratio 2.17   (A) Abnormal value           The 10-year ASCVD risk score (Slateraneudy KNIGHT Jr., et al., 2013) is: 15.2%    Values used to calculate the score:      Age: 71 years      Sex: Female      Is Non- : No      Diabetic: No      Tobacco smoker: No      Systolic Blood Pressure: 158 mmHg      Is BP treated: No      HDL Cholesterol: 53 mg/dL      Total Cholesterol: 182 mg/dL       Assessment and Plan    Diagnoses and all orders for this visit:    1. Palpitations (Primary)  EKG in office today shows sinus rhythm with rate of 59 bpm.  Her palpitations are most likely from PVCs, this was explained to the patient.  We will go ahead and obtain Holter  monitor to assess for tachyarrhythmia.  We discussed decreasing caffeine intake.  We also discussed beta-blocker therapy being an option if results show average elevated heart rate or frequent PVCs.    2. WHITE (dyspnea on exertion)  Worsening shortness of breath with physical exertion, obtain nuclear stress test to assess for ischemia.  Obtain echocardiogram to assess valve function and left ventricular systolic function.  -     Stress Test With Myocardial Perfusion One Day; Future  -     Adult Transthoracic Echo Complete W/ Cont if Necessary Per Protocol; Future    3. Lightheadedness  Patient is reporting some lightheadedness with her episodes of increased palpitations and has history of syncope, obtain 7-day Holter monitor to assess for bradycardia arrhythmia.  -     Holter Monitor - 72 Hour Up To 15 Days; Future    4. Primary hypertension  Elevated in office today, start lisinopril 2.5 mg daily. Check blood pressure twice a day for the next two weeks, blood pressure log provided for patient.  Will review log once available to me will make any necessary medication adjustments at that time.  She most recently had fasting lipid panel on 4/14/2022 with .  Her 10-year ASCVD risk score is 15.2% which means she would benefit from statin therapy.  Start lovastatin 20 mg daily and recheck fasting lipid and hepatic function panel in 3 months.  -     Lipid Panel; Future  -     Hepatic Function Panel; Future    Other orders  -     lovastatin (MEVACOR) 20 MG tablet; Take 1 tablet by mouth Daily With Dinner.  Dispense: 90 tablet; Refill: 1  -     lisinopril (PRINIVIL,ZESTRIL) 2.5 MG tablet; Take 1 tablet by mouth Daily.  Dispense: 90 tablet; Refill: 1  -     ECG 12 Lead            Follow Up   Return in about 3 months (around 7/25/2022) for Follow up with Dr Zuniga.    Patient was given instructions and counseling regarding her condition or for health maintenance advice. Please see specific information pulled into the AVS  if appropriate.     Gregoria Claire  reports that she has never smoked. She has never used smokeless tobacco.           Lucila Stark, APRN  04/25/22  14:03 EDT    Dictated Utilizing Dragon Dictation

## 2022-06-09 ENCOUNTER — OFFICE VISIT (OUTPATIENT)
Dept: INTERNAL MEDICINE | Facility: CLINIC | Age: 72
End: 2022-06-09

## 2022-06-09 ENCOUNTER — APPOINTMENT (OUTPATIENT)
Dept: GENERAL RADIOLOGY | Facility: HOSPITAL | Age: 72
End: 2022-06-09

## 2022-06-09 ENCOUNTER — HOSPITAL ENCOUNTER (OUTPATIENT)
Facility: HOSPITAL | Age: 72
Setting detail: OBSERVATION
Discharge: HOME | End: 2022-06-10
Attending: EMERGENCY MEDICINE | Admitting: INTERNAL MEDICINE

## 2022-06-09 ENCOUNTER — APPOINTMENT (OUTPATIENT)
Dept: NEUROLOGY | Facility: HOSPITAL | Age: 72
End: 2022-06-09

## 2022-06-09 ENCOUNTER — APPOINTMENT (OUTPATIENT)
Dept: NUCLEAR MEDICINE | Facility: HOSPITAL | Age: 72
End: 2022-06-09

## 2022-06-09 ENCOUNTER — APPOINTMENT (OUTPATIENT)
Dept: CT IMAGING | Facility: HOSPITAL | Age: 72
End: 2022-06-09

## 2022-06-09 ENCOUNTER — APPOINTMENT (OUTPATIENT)
Dept: MAMMOGRAPHY | Facility: HOSPITAL | Age: 72
End: 2022-06-09

## 2022-06-09 ENCOUNTER — APPOINTMENT (OUTPATIENT)
Dept: CARDIOLOGY | Facility: HOSPITAL | Age: 72
End: 2022-06-09

## 2022-06-09 ENCOUNTER — APPOINTMENT (OUTPATIENT)
Dept: MRI IMAGING | Facility: HOSPITAL | Age: 72
End: 2022-06-09

## 2022-06-09 ENCOUNTER — HOSPITAL ENCOUNTER (OUTPATIENT)
Dept: NUCLEAR MEDICINE | Facility: HOSPITAL | Age: 72
End: 2022-06-09

## 2022-06-09 VITALS
SYSTOLIC BLOOD PRESSURE: 154 MMHG | HEART RATE: 55 BPM | OXYGEN SATURATION: 98 % | DIASTOLIC BLOOD PRESSURE: 80 MMHG | TEMPERATURE: 97.7 F
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
## 2022-06-09 DIAGNOSIS — S09.90XA RECENT HEAD TRAUMA, INITIAL ENCOUNTER: Primary | ICD-10-CM

## 2022-06-09 DIAGNOSIS — R26.2 DIFFICULTY IN WALKING: ICD-10-CM

## 2022-06-09 DIAGNOSIS — R41.82 ALTERED MENTAL STATUS, UNSPECIFIED ALTERED MENTAL STATUS TYPE: ICD-10-CM

## 2022-06-09 DIAGNOSIS — S01.01XA LACERATION OF SCALP, INITIAL ENCOUNTER: ICD-10-CM

## 2022-06-09 DIAGNOSIS — Z78.9 DECREASED ACTIVITIES OF DAILY LIVING (ADL): ICD-10-CM

## 2022-06-09 PROBLEM — R55 SYNCOPE: Status: ACTIVE | Noted: 2022-06-09

## 2022-06-09 LAB
ALBUMIN SERPL-MCNC: 4.4 G/DL (ref 3.5–5.2)
ALBUMIN/GLOB SERPL: 1.7 G/DL
ALP SERPL-CCNC: 56 U/L (ref 39–117)
ALT SERPL W P-5'-P-CCNC: 19 U/L (ref 1–33)
AMPHET+METHAMPHET UR QL: NEGATIVE
ANION GAP SERPL CALCULATED.3IONS-SCNC: 10.3 MMOL/L (ref 5–15)
APAP SERPL-MCNC: <5 MCG/ML (ref 0–30)
AST SERPL-CCNC: 17 U/L (ref 1–32)
BACTERIA UR QL AUTO: ABNORMAL /HPF
BARBITURATES UR QL SCN: NEGATIVE
BASOPHILS # BLD AUTO: 0.09 10*3/MM3 (ref 0–0.2)
BASOPHILS NFR BLD AUTO: 1.4 % (ref 0–1.5)
BENZODIAZ UR QL SCN: NEGATIVE
BH CV ECHO MEAS - AO ROOT DIAM: 2.7 CM
BH CV ECHO MEAS - EDV(MOD-SP2): 82 ML
BH CV ECHO MEAS - EDV(MOD-SP4): 113 ML
BH CV ECHO MEAS - EF(MOD-BP): 60 %
BH CV ECHO MEAS - ESV(MOD-SP2): 32 ML
BH CV ECHO MEAS - ESV(MOD-SP4): 48 ML
BH CV ECHO MEAS - IVSD: 1 CM
BH CV ECHO MEAS - LA DIMENSION: 3.8 CM
BH CV ECHO MEAS - LAT PEAK E' VEL: 8.2 CM/SEC
BH CV ECHO MEAS - LVIDD: 5 CM
BH CV ECHO MEAS - LVIDS: 3.2 CM
BH CV ECHO MEAS - LVOT DIAM: 1.9 CM
BH CV ECHO MEAS - LVPWD: 1 CM
BH CV ECHO MEAS - MED PEAK E' VEL: 5.44 CM/SEC
BH CV ECHO MEAS - MV A MAX VEL: 96 CM/SEC
BH CV ECHO MEAS - MV DEC TIME: 231 MSEC
BH CV ECHO MEAS - MV E MAX VEL: 63 CM/SEC
BH CV ECHO MEAS - MV E/A: 0.6
BH CV ECHO MEAS - MV MAX PG: 3 MMHG
BH CV ECHO MEAS - MV MEAN PG: 1 MMHG
BH CV ECHO MEAS - MV P1/2T: 77 MSEC
BH CV ECHO MEAS - MV V2 VTI: 39 CM
BH CV ECHO MEAS - MVA(P1/2T): 2.86 CM2
BH CV ECHO MEAS - RAP SYSTOLE: 3 MMHG
BH CV ECHO MEAS - RVDD: 2.7 CM
BH CV ECHO MEAS - RVSP: 25 MMHG
BH CV ECHO MEAS - TR MAX PG: 22 MMHG
BH CV ECHO MEAS - TR MAX VEL: 237 CM/SEC
BH CV ECHO MEASUREMENTS AVERAGE E/E' RATIO: 9.24
BILIRUB SERPL-MCNC: 0.2 MG/DL (ref 0–1.2)
BILIRUB UR QL STRIP: NEGATIVE
BUN SERPL-MCNC: 15 MG/DL (ref 8–23)
BUN/CREAT SERPL: 20.8 (ref 7–25)
CALCIUM SPEC-SCNC: 9.5 MG/DL (ref 8.6–10.5)
CANNABINOIDS SERPL QL: NEGATIVE
CHLORIDE SERPL-SCNC: 106 MMOL/L (ref 98–107)
CK SERPL-CCNC: 63 U/L (ref 20–180)
CLARITY UR: CLEAR
CO2 SERPL-SCNC: 25.7 MMOL/L (ref 22–29)
COCAINE UR QL: NEGATIVE
COLOR UR: YELLOW
CREAT SERPL-MCNC: 0.72 MG/DL (ref 0.57–1)
DEPRECATED RDW RBC AUTO: 42.6 FL (ref 37–54)
EGFRCR SERPLBLD CKD-EPI 2021: 89.5 ML/MIN/1.73
EOSINOPHIL # BLD AUTO: 0.32 10*3/MM3 (ref 0–0.4)
EOSINOPHIL NFR BLD AUTO: 5 % (ref 0.3–6.2)
ERYTHROCYTE [DISTWIDTH] IN BLOOD BY AUTOMATED COUNT: 12.8 % (ref 12.3–15.4)
ETHANOL BLD-MCNC: <10 MG/DL (ref 0–10)
ETHANOL UR QL: <0.01 %
GLOBULIN UR ELPH-MCNC: 2.6 GM/DL
GLUCOSE SERPL-MCNC: 114 MG/DL (ref 65–99)
GLUCOSE UR STRIP-MCNC: NEGATIVE MG/DL
HCT VFR BLD AUTO: 42.7 % (ref 34–46.6)
HGB BLD-MCNC: 13.8 G/DL (ref 12–15.9)
HGB UR QL STRIP.AUTO: ABNORMAL
HOLD SPECIMEN: NORMAL
HOLD SPECIMEN: NORMAL
HYALINE CASTS UR QL AUTO: ABNORMAL /LPF
IMM GRANULOCYTES # BLD AUTO: 0.03 10*3/MM3 (ref 0–0.05)
IMM GRANULOCYTES NFR BLD AUTO: 0.5 % (ref 0–0.5)
INR PPP: 0.9 (ref 0.86–1.15)
IVRT: 99 MSEC
KETONES UR QL STRIP: NEGATIVE
LEFT ATRIUM VOLUME INDEX: 33.2 ML/M2
LEUKOCYTE ESTERASE UR QL STRIP.AUTO: ABNORMAL
LYMPHOCYTES # BLD AUTO: 1.33 10*3/MM3 (ref 0.7–3.1)
LYMPHOCYTES NFR BLD AUTO: 20.8 % (ref 19.6–45.3)
MAGNESIUM SERPL-MCNC: 2.4 MG/DL (ref 1.6–2.4)
MAXIMAL PREDICTED HEART RATE: 149 BPM
MCH RBC QN AUTO: 29.4 PG (ref 26.6–33)
MCHC RBC AUTO-ENTMCNC: 32.3 G/DL (ref 31.5–35.7)
MCV RBC AUTO: 90.9 FL (ref 79–97)
METHADONE UR QL SCN: NEGATIVE
MONOCYTES # BLD AUTO: 0.56 10*3/MM3 (ref 0.1–0.9)
MONOCYTES NFR BLD AUTO: 8.8 % (ref 5–12)
NEUTROPHILS NFR BLD AUTO: 4.07 10*3/MM3 (ref 1.7–7)
NEUTROPHILS NFR BLD AUTO: 63.5 % (ref 42.7–76)
NITRITE UR QL STRIP: NEGATIVE
NRBC BLD AUTO-RTO: 0 /100 WBC (ref 0–0.2)
OPIATES UR QL: NEGATIVE
OXYCODONE UR QL SCN: NEGATIVE
PH UR STRIP.AUTO: 5.5 [PH] (ref 5–8)
PLATELET # BLD AUTO: 385 10*3/MM3 (ref 140–450)
PMV BLD AUTO: 9.2 FL (ref 6–12)
POTASSIUM SERPL-SCNC: 4.3 MMOL/L (ref 3.5–5.2)
PROT SERPL-MCNC: 7 G/DL (ref 6–8.5)
PROT UR QL STRIP: NEGATIVE
PROTHROMBIN TIME: 12.2 SECONDS (ref 11.8–14.9)
QT INTERVAL: 569 MS
RBC # BLD AUTO: 4.7 10*6/MM3 (ref 3.77–5.28)
RBC # UR STRIP: ABNORMAL /HPF
REF LAB TEST METHOD: ABNORMAL
SALICYLATES SERPL-MCNC: <0.3 MG/DL
SODIUM SERPL-SCNC: 142 MMOL/L (ref 136–145)
SP GR UR STRIP: 1.02 (ref 1–1.03)
SQUAMOUS #/AREA URNS HPF: ABNORMAL /HPF
STRESS TARGET HR: 127 BPM
T4 FREE SERPL-MCNC: 0.76 NG/DL (ref 0.93–1.7)
TSH SERPL DL<=0.05 MIU/L-ACNC: 25.62 UIU/ML (ref 0.27–4.2)
UROBILINOGEN UR QL STRIP: ABNORMAL
WBC # UR STRIP: ABNORMAL /HPF
WBC NRBC COR # BLD: 6.4 10*3/MM3 (ref 3.4–10.8)
WHOLE BLOOD HOLD COAG: NORMAL
WHOLE BLOOD HOLD SPECIMEN: NORMAL

## 2022-06-09 PROCEDURE — G0378 HOSPITAL OBSERVATION PER HR: HCPCS

## 2022-06-09 PROCEDURE — 84443 ASSAY THYROID STIM HORMONE: CPT | Performed by: INTERNAL MEDICINE

## 2022-06-09 PROCEDURE — 80307 DRUG TEST PRSMV CHEM ANLYZR: CPT | Performed by: EMERGENCY MEDICINE

## 2022-06-09 PROCEDURE — 93005 ELECTROCARDIOGRAM TRACING: CPT | Performed by: EMERGENCY MEDICINE

## 2022-06-09 PROCEDURE — 87086 URINE CULTURE/COLONY COUNT: CPT | Performed by: EMERGENCY MEDICINE

## 2022-06-09 PROCEDURE — 82550 ASSAY OF CK (CPK): CPT | Performed by: EMERGENCY MEDICINE

## 2022-06-09 PROCEDURE — 95816 EEG AWAKE AND DROWSY: CPT

## 2022-06-09 PROCEDURE — 93010 ELECTROCARDIOGRAM REPORT: CPT | Performed by: INTERNAL MEDICINE

## 2022-06-09 PROCEDURE — 0 IOPAMIDOL PER 1 ML: Performed by: EMERGENCY MEDICINE

## 2022-06-09 PROCEDURE — 70551 MRI BRAIN STEM W/O DYE: CPT

## 2022-06-09 PROCEDURE — 93306 TTE W/DOPPLER COMPLETE: CPT

## 2022-06-09 PROCEDURE — 93005 ELECTROCARDIOGRAM TRACING: CPT | Performed by: INTERNAL MEDICINE

## 2022-06-09 PROCEDURE — 83735 ASSAY OF MAGNESIUM: CPT | Performed by: INTERNAL MEDICINE

## 2022-06-09 PROCEDURE — 70450 CT HEAD/BRAIN W/O DYE: CPT

## 2022-06-09 PROCEDURE — 80143 DRUG ASSAY ACETAMINOPHEN: CPT | Performed by: EMERGENCY MEDICINE

## 2022-06-09 PROCEDURE — 82077 ASSAY SPEC XCP UR&BREATH IA: CPT | Performed by: EMERGENCY MEDICINE

## 2022-06-09 PROCEDURE — 80179 DRUG ASSAY SALICYLATE: CPT | Performed by: EMERGENCY MEDICINE

## 2022-06-09 PROCEDURE — 80053 COMPREHEN METABOLIC PANEL: CPT | Performed by: EMERGENCY MEDICINE

## 2022-06-09 PROCEDURE — 25010000002 ONDANSETRON PER 1 MG: Performed by: INTERNAL MEDICINE

## 2022-06-09 PROCEDURE — 70498 CT ANGIOGRAPHY NECK: CPT

## 2022-06-09 PROCEDURE — 85025 COMPLETE CBC W/AUTO DIFF WBC: CPT | Performed by: EMERGENCY MEDICINE

## 2022-06-09 PROCEDURE — 84439 ASSAY OF FREE THYROXINE: CPT | Performed by: INTERNAL MEDICINE

## 2022-06-09 PROCEDURE — 99215 OFFICE O/P EST HI 40 MIN: CPT | Performed by: NURSE PRACTITIONER

## 2022-06-09 PROCEDURE — 96374 THER/PROPH/DIAG INJ IV PUSH: CPT

## 2022-06-09 PROCEDURE — 99220 PR INITIAL OBSERVATION CARE/DAY 70 MINUTES: CPT | Performed by: INTERNAL MEDICINE

## 2022-06-09 PROCEDURE — 85610 PROTHROMBIN TIME: CPT | Performed by: EMERGENCY MEDICINE

## 2022-06-09 PROCEDURE — 70496 CT ANGIOGRAPHY HEAD: CPT

## 2022-06-09 PROCEDURE — 93306 TTE W/DOPPLER COMPLETE: CPT | Performed by: INTERNAL MEDICINE

## 2022-06-09 PROCEDURE — 82962 GLUCOSE BLOOD TEST: CPT

## 2022-06-09 PROCEDURE — 82565 ASSAY OF CREATININE: CPT

## 2022-06-09 PROCEDURE — 81001 URINALYSIS AUTO W/SCOPE: CPT | Performed by: EMERGENCY MEDICINE

## 2022-06-09 PROCEDURE — 71045 X-RAY EXAM CHEST 1 VIEW: CPT

## 2022-06-09 PROCEDURE — 99285 EMERGENCY DEPT VISIT HI MDM: CPT

## 2022-06-09 RX ORDER — LEVOTHYROXINE SODIUM 0.15 MG/1
150 TABLET ORAL
Status: DISCONTINUED | OUTPATIENT
Start: 2022-06-10 | End: 2022-06-11 | Stop reason: HOSPADM

## 2022-06-09 RX ORDER — HYDRALAZINE HYDROCHLORIDE 25 MG/1
25 TABLET, FILM COATED ORAL EVERY 6 HOURS PRN
Status: DISCONTINUED | OUTPATIENT
Start: 2022-06-09 | End: 2022-06-11 | Stop reason: HOSPADM

## 2022-06-09 RX ORDER — ONDANSETRON 2 MG/ML
4 INJECTION INTRAMUSCULAR; INTRAVENOUS EVERY 6 HOURS PRN
Status: DISCONTINUED | OUTPATIENT
Start: 2022-06-09 | End: 2022-06-11 | Stop reason: HOSPADM

## 2022-06-09 RX ORDER — SODIUM CHLORIDE 0.9 % (FLUSH) 0.9 %
10 SYRINGE (ML) INJECTION AS NEEDED
Status: DISCONTINUED | OUTPATIENT
Start: 2022-06-09 | End: 2022-06-11 | Stop reason: HOSPADM

## 2022-06-09 RX ORDER — ACETAMINOPHEN 160 MG/5ML
650 SOLUTION ORAL ONCE
Status: DISCONTINUED | OUTPATIENT
Start: 2022-06-09 | End: 2022-06-11 | Stop reason: HOSPADM

## 2022-06-09 RX ORDER — ASPIRIN 300 MG/1
300 SUPPOSITORY RECTAL DAILY
Status: DISCONTINUED | OUTPATIENT
Start: 2022-06-09 | End: 2022-06-09

## 2022-06-09 RX ORDER — ACETAMINOPHEN 325 MG/1
650 TABLET ORAL EVERY 6 HOURS PRN
Status: DISCONTINUED | OUTPATIENT
Start: 2022-06-09 | End: 2022-06-11 | Stop reason: HOSPADM

## 2022-06-09 RX ORDER — LIDOCAINE HYDROCHLORIDE AND EPINEPHRINE 10; 10 MG/ML; UG/ML
10 INJECTION, SOLUTION INFILTRATION; PERINEURAL ONCE
Status: COMPLETED | OUTPATIENT
Start: 2022-06-09 | End: 2022-06-09

## 2022-06-09 RX ORDER — ASPIRIN 325 MG
325 TABLET ORAL DAILY
Status: DISCONTINUED | OUTPATIENT
Start: 2022-06-09 | End: 2022-06-09

## 2022-06-09 RX ORDER — ATORVASTATIN CALCIUM 40 MG/1
80 TABLET, FILM COATED ORAL NIGHTLY
Status: DISCONTINUED | OUTPATIENT
Start: 2022-06-09 | End: 2022-06-11 | Stop reason: HOSPADM

## 2022-06-09 RX ORDER — ASPIRIN 81 MG/1
81 TABLET ORAL DAILY
Status: DISCONTINUED | OUTPATIENT
Start: 2022-06-09 | End: 2022-06-10

## 2022-06-09 RX ORDER — ENOXAPARIN SODIUM 100 MG/ML
40 INJECTION SUBCUTANEOUS
Status: DISCONTINUED | OUTPATIENT
Start: 2022-06-09 | End: 2022-06-11 | Stop reason: HOSPADM

## 2022-06-09 RX ORDER — LISINOPRIL 5 MG/1
5 TABLET ORAL
Status: DISCONTINUED | OUTPATIENT
Start: 2022-06-09 | End: 2022-06-11 | Stop reason: HOSPADM

## 2022-06-09 RX ORDER — ONDANSETRON 2 MG/ML
4 INJECTION INTRAMUSCULAR; INTRAVENOUS ONCE
Status: DISCONTINUED | OUTPATIENT
Start: 2022-06-09 | End: 2022-06-11 | Stop reason: HOSPADM

## 2022-06-09 RX ADMIN — ONDANSETRON 4 MG: 2 INJECTION INTRAMUSCULAR; INTRAVENOUS at 19:43

## 2022-06-09 RX ADMIN — ACETAMINOPHEN 650 MG: 325 TABLET ORAL at 19:44

## 2022-06-09 RX ADMIN — LIDOCAINE HYDROCHLORIDE,EPINEPHRINE BITARTRATE 10 ML: 10; .01 INJECTION, SOLUTION INFILTRATION; PERINEURAL at 12:07

## 2022-06-09 RX ADMIN — LISINOPRIL 5 MG: 5 TABLET ORAL at 19:44

## 2022-06-09 RX ADMIN — IOPAMIDOL 100 ML: 755 INJECTION, SOLUTION INTRAVENOUS at 13:45

## 2022-06-09 RX ADMIN — ATORVASTATIN CALCIUM 80 MG: 40 TABLET, FILM COATED ORAL at 20:11

## 2022-06-09 RX ADMIN — ASPIRIN 81 MG: 81 TABLET, COATED ORAL at 19:44

## 2022-06-09 NOTE — PROGRESS NOTES
Chief Complaint  Fall (Was dreaming and fell out of bed and hit her head on night stand and now its bleeding, feeling very dizzy)    Subjective         Gregoria Claire presents to [facility] for Scalp laceration after a fall.    Patient reports that this morning she woke up in the floor, she states that she was dreaming about a man chasing her, and believes she fell out of bed.  She was in the floor when she woke up, she is seen noticed that she was bleeding from her left posterior scalp, she stated that there was significant amount of blood, she went into the bathroom to clean herself up and applied pressure.  Patient then walked down to the manager's office to get help, the manager was able to find a friend that brought her directly to our office this morning.  Patient did not have an appointment.  Patient reports that she has felt lightheaded and dizzy since the fall, she denied any nausea vomiting, denied any blurred vision.  Bleeding was controlled by direct pressure.  Patient denies any other significant symptoms at the time of intake.         Review of Systems    Objective   Vital Signs:   /80 (BP Location: Left arm, Patient Position: Sitting, Cuff Size: Adult)   Pulse 55   Temp 97.7 °F (36.5 °C) (Oral)   SpO2 98%     Physical Exam  Vitals and nursing note reviewed.   Constitutional:       Appearance: Normal appearance. She is normal weight.   HENT:      Head:      Comments: There is about a 1 inch laceration to the left posterior lateral scalp.  Bleeding has been controlled.  There is dried blood noted in the hair and on the neck.     Right Ear: Tympanic membrane, ear canal and external ear normal.      Left Ear: Tympanic membrane, ear canal and external ear normal.      Nose: Nose normal.      Mouth/Throat:      Mouth: Mucous membranes are moist.      Pharynx: Oropharynx is clear.   Eyes:      Conjunctiva/sclera: Conjunctivae normal.      Pupils: Pupils are equal, round,  and reactive to light.   Cardiovascular:      Rate and Rhythm: Normal rate and regular rhythm.   Pulmonary:      Effort: Pulmonary effort is normal.      Breath sounds: Normal breath sounds.   Neurological:      Mental Status: She is alert. She is disoriented.      Motor: Weakness present.        Result Review :                   Diagnoses and all orders for this visit:    1. Recent head trauma, initial encounter (Primary)  Assessment & Plan:  On initial evaluation and reevaluation patient has become more sluggish, slow to respond, falling asleep very easily while sitting in wheelchair.  Patient is somewhat disoriented, and has started complaining of nausea.  It was decided based on these findings to have patient transported to the emergency room for further evaluation and management.  Patient agrees with the plan.  EMS will transport patient directly to the ER.  We will call ER and let them know patient is coming.    Patient report was called to RIZWANA Sloan triage nurse at [facility] emergency room at 0925.    EMS arrival at 0926, report given to EMS crew, they departed our facility at 0928.    At time of departure, patient was still arousable, with a light tap.  Still with nausea, no vomiting.  Still somewhat sluggish to respond and weak.                Follow Up   Return if symptoms worsen or fail to improve.  Patient was given instructions and counseling regarding her condition or for health maintenance advice. Please see specific information pulled into the AVS if appropriate.     IAN Thomson  6/9/2022  This note was electronically signed.

## 2022-06-09 NOTE — ED TRIAGE NOTES
Pt brought in by EMS from PCP office for AMS. Stroke intervention was called. PT unable to retell going to PCP.

## 2022-06-09 NOTE — ED PROVIDER NOTES
"Time: 10:15 AM EDT  Arrived by: EMS  Chief Complaint: Fall  History provided by: Patient, nursing, and records  History is limited by: N/A    History of Present Illness:    Gregoria Claire is a 71 y.o. female who presents to the emergency department today with complaints of a fall. Nursing reports that sometime last night, the patient fell out of bed. She subsequently woke on the floor. Per her records, she then noticed moderate bleeding from the left occiput. The patient is unable to recall this event or how she made it to her PCP Dr. Lucas's office, but was found to be altered during this visit. The patient was then referred to the ED via EMS for further evaluation.    The patient denies recent symptoms of illness, but does note some mild right-sided chest pain. She also denies taking any new medications or recent alcohol intake.    The patient has a medical history of hypertension, hyperthyroidism, and breast cancer. The patient denies smoking cigarettes, drinking alcohol, or illicit drug use. There are no other acute complaints at this time.         History provided by:  Patient and medical records (Nursing)   used: No    Fall  Mechanism of injury: fall    Injury location:  Head/neck  Head/neck injury location:  Head  Incident location:  Home  Time since incident: \"Last night\"  Fall:     Fall occurred:  From a bed    Point of impact:  Head  Protective equipment: none    Suspicion of alcohol use: no    Suspicion of drug use: no    Prior to arrival data:     Loss of consciousness: yes      Amnesic to event: yes    Associated symptoms: chest pain (right-sided) and loss of consciousness    Associated symptoms: no back pain, no neck pain and no vomiting    Risk factors comment:  None on file.          Similar Symptoms Previously: No.  Recently seen: Patient was seen by Reggie Stockton today for a fall.      Patient Care Team  Primary Care Provider: Reggie Stockton APRN and Dr. Lucas    Past Medical " History:     No Known Allergies  Past Medical History:   Diagnosis Date   • Breast cancer (HCC)    • Depression    • Hypertension    • Hyperthyroidism      Past Surgical History:   Procedure Laterality Date   • HYSTERECTOMY     • MASTECTOMY       History reviewed. No pertinent family history.    Home Medications:  Prior to Admission medications    Medication Sig Start Date End Date Taking? Authorizing Provider   lamoTRIgine (LaMICtal) 150 MG tablet Take  by mouth Daily. 1/2 tab qd 3/8/22   Manjit Lui MD   levothyroxine (SYNTHROID, LEVOTHROID) 125 MCG tablet Take 125 mcg by mouth Daily. 3/9/22   Manjit Lui MD   lisinopril (PRINIVIL,ZESTRIL) 2.5 MG tablet Take 1 tablet by mouth Daily. 4/25/22   Lucila Stark APRN   lovastatin (MEVACOR) 20 MG tablet Take 1 tablet by mouth Daily With Dinner. 4/25/22   Lucila Stark APRN   oxybutynin (DITROPAN) 5 MG tablet Take 1 tablet by mouth 2 (Two) Times a Day. May cause dry mouth 4/14/22   Reggie Stockton APRN   traZODone (DESYREL) 75 MG half tablet Take 75 mg by mouth Every Night.    ProviderManjit MD   venlafaxine XR (EFFEXOR-XR) 37.5 MG 24 hr capsule Effexor XR 37.5 mg oral capsule,extended release 24hr take 1 capsule (37.5 mg) by oral route once daily with food   Active    Provider, MD Manjit        Social History:   PT  reports that she has never smoked. She has never used smokeless tobacco. She reports that she does not drink alcohol and does not use drugs.    Record Review:  I have reviewed the patient's records in [facility].     Review of Systems  Review of Systems   Constitutional: Negative for chills and fever.   HENT: Negative for nosebleeds.    Eyes: Negative for redness.   Respiratory: Negative for cough and shortness of breath.    Cardiovascular: Positive for chest pain (right-sided).   Gastrointestinal: Negative for diarrhea and vomiting.   Genitourinary: Negative for dysuria and frequency.   Musculoskeletal: Negative for  "back pain and neck pain.   Skin: Positive for wound (laceration to left occiput). Negative for rash.   Neurological: Positive for loss of consciousness. Negative for tremors.        Head injury.   Psychiatric/Behavioral: Positive for confusion.   All other systems reviewed and are negative.       Physical Exam  /52 (BP Location: Left arm, Patient Position: Lying)   Pulse 53   Temp 98.1 °F (36.7 °C) (Oral)   Resp 16   Ht 170.2 cm (67\")   Wt 76.5 kg (168 lb 10.4 oz)   LMP  (LMP Unknown)   SpO2 99%   BMI 26.41 kg/m²     Physical Exam  Vitals and nursing note reviewed.   Constitutional:       General: She is not in acute distress.  HENT:      Head: Normocephalic. Laceration present.      Comments: Laceration to left inferior occiput area.     Nose: Nose normal.      Mouth/Throat:      Mouth: Mucous membranes are moist.   Eyes:      General: No scleral icterus.  Cardiovascular:      Rate and Rhythm: Regular rhythm. Bradycardia present.      Heart sounds: Normal heart sounds. No murmur heard.  Pulmonary:      Effort: No respiratory distress.      Breath sounds: Normal breath sounds.   Chest:      Chest wall: Tenderness present.      Comments: Mild right-sided chest wall tenderness.  Abdominal:      Palpations: Abdomen is soft.      Tenderness: There is no abdominal tenderness.   Musculoskeletal:         General: No tenderness. Normal range of motion.      Cervical back: Normal range of motion and neck supple.      Right lower leg: No edema.      Left lower leg: No edema.   Skin:     General: Skin is warm and dry.   Neurological:      Mental Status: She is alert.      Comments: Alert and oriented to month. She has amnesia for the event and for events earlier today.   Psychiatric:         Behavior: Behavior normal.                  ED Course  /52 (BP Location: Left arm, Patient Position: Lying)   Pulse 53   Temp 98.1 °F (36.7 °C) (Oral)   Resp 16   Ht 170.2 cm (67\")   Wt 76.5 kg (168 lb 10.4 oz)   " LMP  (LMP Unknown)   SpO2 99%   BMI 26.41 kg/m²   Results for orders placed or performed during the hospital encounter of 06/09/22   Comprehensive Metabolic Panel    Specimen: Blood   Result Value Ref Range    Glucose 114 (H) 65 - 99 mg/dL    BUN 15 8 - 23 mg/dL    Creatinine 0.72 0.57 - 1.00 mg/dL    Sodium 142 136 - 145 mmol/L    Potassium 4.3 3.5 - 5.2 mmol/L    Chloride 106 98 - 107 mmol/L    CO2 25.7 22.0 - 29.0 mmol/L    Calcium 9.5 8.6 - 10.5 mg/dL    Total Protein 7.0 6.0 - 8.5 g/dL    Albumin 4.40 3.50 - 5.20 g/dL    ALT (SGPT) 19 1 - 33 U/L    AST (SGOT) 17 1 - 32 U/L    Alkaline Phosphatase 56 39 - 117 U/L    Total Bilirubin 0.2 0.0 - 1.2 mg/dL    Globulin 2.6 gm/dL    A/G Ratio 1.7 g/dL    BUN/Creatinine Ratio 20.8 7.0 - 25.0    Anion Gap 10.3 5.0 - 15.0 mmol/L    eGFR 89.5 >60.0 mL/min/1.73   Protime-INR    Specimen: Blood   Result Value Ref Range    Protime 12.2 11.8 - 14.9 Seconds    INR 0.90 0.86 - 1.15   CBC Auto Differential    Specimen: Blood   Result Value Ref Range    WBC 6.40 3.40 - 10.80 10*3/mm3    RBC 4.70 3.77 - 5.28 10*6/mm3    Hemoglobin 13.8 12.0 - 15.9 g/dL    Hematocrit 42.7 34.0 - 46.6 %    MCV 90.9 79.0 - 97.0 fL    MCH 29.4 26.6 - 33.0 pg    MCHC 32.3 31.5 - 35.7 g/dL    RDW 12.8 12.3 - 15.4 %    RDW-SD 42.6 37.0 - 54.0 fl    MPV 9.2 6.0 - 12.0 fL    Platelets 385 140 - 450 10*3/mm3    Neutrophil % 63.5 42.7 - 76.0 %    Lymphocyte % 20.8 19.6 - 45.3 %    Monocyte % 8.8 5.0 - 12.0 %    Eosinophil % 5.0 0.3 - 6.2 %    Basophil % 1.4 0.0 - 1.5 %    Immature Grans % 0.5 0.0 - 0.5 %    Neutrophils, Absolute 4.07 1.70 - 7.00 10*3/mm3    Lymphocytes, Absolute 1.33 0.70 - 3.10 10*3/mm3    Monocytes, Absolute 0.56 0.10 - 0.90 10*3/mm3    Eosinophils, Absolute 0.32 0.00 - 0.40 10*3/mm3    Basophils, Absolute 0.09 0.00 - 0.20 10*3/mm3    Immature Grans, Absolute 0.03 0.00 - 0.05 10*3/mm3    nRBC 0.0 0.0 - 0.2 /100 WBC   CK    Specimen: Blood   Result Value Ref Range    Creatine Kinase 63 20  - 180 U/L   Urinalysis With Culture If Indicated - Urine, Clean Catch    Specimen: Urine, Clean Catch   Result Value Ref Range    Color, UA Yellow Yellow, Straw    Appearance, UA Clear Clear    pH, UA 5.5 5.0 - 8.0    Specific Gravity, UA 1.019 1.005 - 1.030    Glucose, UA Negative Negative    Ketones, UA Negative Negative    Bilirubin, UA Negative Negative    Blood, UA Large (3+) (A) Negative    Protein, UA Negative Negative    Leuk Esterase, UA Small (1+) (A) Negative    Nitrite, UA Negative Negative    Urobilinogen, UA 0.2 E.U./dL 0.2 - 1.0 E.U./dL   Acetaminophen Level    Specimen: Blood   Result Value Ref Range    Acetaminophen <5.0 0.0 - 30.0 mcg/mL   Ethanol    Specimen: Blood   Result Value Ref Range    Ethanol <10 0 - 10 mg/dL    Ethanol % <0.010 %   Urine Drug Screen - Urine, Clean Catch    Specimen: Urine, Clean Catch   Result Value Ref Range    Amphet/Methamphet, Screen Negative Negative    Barbiturates Screen, Urine Negative Negative    Benzodiazepine Screen, Urine Negative Negative    Cocaine Screen, Urine Negative Negative    Opiate Screen Negative Negative    THC, Screen, Urine Negative Negative    Methadone Screen, Urine Negative Negative    Oxycodone Screen, Urine Negative Negative   Salicylate Level    Specimen: Blood   Result Value Ref Range    Salicylate <0.3 <=30.0 mg/dL   Urinalysis, Microscopic Only - Urine, Clean Catch    Specimen: Urine, Clean Catch   Result Value Ref Range    RBC, UA 31-50 (A) None Seen /HPF    WBC, UA 6-12 (A) None Seen /HPF    Bacteria, UA None Seen None Seen /HPF    Squamous Epithelial Cells, UA 0-2 None Seen, 0-2 /HPF    Hyaline Casts, UA 0-2 None Seen /LPF    Methodology Automated Microscopy    TSH Rfx On Abnormal To Free T4    Specimen: Blood   Result Value Ref Range    TSH 25.620 (H) 0.270 - 4.200 uIU/mL   T4, Free    Specimen: Blood   Result Value Ref Range    Free T4 0.76 (L) 0.93 - 1.70 ng/dL   Magnesium    Specimen: Blood   Result Value Ref Range    Magnesium  2.4 1.6 - 2.4 mg/dL   Basic Metabolic Panel    Specimen: Blood   Result Value Ref Range    Glucose 138 (H) 65 - 99 mg/dL    BUN 18 8 - 23 mg/dL    Creatinine 0.73 0.57 - 1.00 mg/dL    Sodium 142 136 - 145 mmol/L    Potassium 4.0 3.5 - 5.2 mmol/L    Chloride 106 98 - 107 mmol/L    CO2 24.5 22.0 - 29.0 mmol/L    Calcium 9.6 8.6 - 10.5 mg/dL    BUN/Creatinine Ratio 24.7 7.0 - 25.0    Anion Gap 11.5 5.0 - 15.0 mmol/L    eGFR 88.0 >60.0 mL/min/1.73   Magnesium    Specimen: Blood   Result Value Ref Range    Magnesium 2.1 1.6 - 2.4 mg/dL   Phosphorus    Specimen: Blood   Result Value Ref Range    Phosphorus 4.4 2.5 - 4.5 mg/dL   Lipid Panel    Specimen: Blood   Result Value Ref Range    Total Cholesterol 242 (H) 0 - 200 mg/dL    Triglycerides 158 (H) 0 - 150 mg/dL    HDL Cholesterol 52 40 - 60 mg/dL    LDL Cholesterol  161 (H) 0 - 100 mg/dL    VLDL Cholesterol 29 5 - 40 mg/dL    LDL/HDL Ratio 3.05    CBC Auto Differential    Specimen: Blood   Result Value Ref Range    WBC 7.03 3.40 - 10.80 10*3/mm3    RBC 4.39 3.77 - 5.28 10*6/mm3    Hemoglobin 12.7 12.0 - 15.9 g/dL    Hematocrit 40.7 34.0 - 46.6 %    MCV 92.7 79.0 - 97.0 fL    MCH 28.9 26.6 - 33.0 pg    MCHC 31.2 (L) 31.5 - 35.7 g/dL    RDW 12.9 12.3 - 15.4 %    RDW-SD 44.0 37.0 - 54.0 fl    MPV 9.6 6.0 - 12.0 fL    Platelets 380 140 - 450 10*3/mm3    Neutrophil % 69.1 42.7 - 76.0 %    Lymphocyte % 17.4 (L) 19.6 - 45.3 %    Monocyte % 8.0 5.0 - 12.0 %    Eosinophil % 4.0 0.3 - 6.2 %    Basophil % 0.9 0.0 - 1.5 %    Immature Grans % 0.6 (H) 0.0 - 0.5 %    Neutrophils, Absolute 4.87 1.70 - 7.00 10*3/mm3    Lymphocytes, Absolute 1.22 0.70 - 3.10 10*3/mm3    Monocytes, Absolute 0.56 0.10 - 0.90 10*3/mm3    Eosinophils, Absolute 0.28 0.00 - 0.40 10*3/mm3    Basophils, Absolute 0.06 0.00 - 0.20 10*3/mm3    Immature Grans, Absolute 0.04 0.00 - 0.05 10*3/mm3    nRBC 0.0 0.0 - 0.2 /100 WBC   ECG 12 Lead   Result Value Ref Range    QT Interval 569 ms   ECG 12 Lead   Result Value  Ref Range    QT Interval 514 ms   Adult Transthoracic Echo Complete W/ Cont if Necessary Per Protocol (With Agitated Saline)   Result Value Ref Range    Target HR (85%) 127 bpm    Max. Pred. HR (100%) 149 bpm    IVRT 99.0 msec    LA ESV Index (BP) 33.2 ml/m2    Avg E/e' ratio 9.24     Ao root diam 2.7 cm    EDV(MOD-sp2) 82.0 ml    EDV(MOD-sp4) 113.0 ml    EF(MOD-bp) 60.0 %    ESV(MOD-sp2) 32.0 ml    ESV(MOD-sp4) 48.0 ml    Lat Peak E' Mac 8.2 cm/sec    LVPWd 1.0 cm    Med Peak E' Mac 5.44 cm/sec    MV dec time 231 msec    MV P1/2t 77.00 msec    MV V2 VTI 39.00 cm    RAP systole 3 mmHg    RVIDd 2.70 cm    RVSP(TR) 25 mmHg    TR max PG 22 mmHg    IVSd 1.0 cm    LA dimension (2D)  3.8 cm    LVIDd 5.0 cm    LVIDs 3.2 cm    LVOT diam 1.9 cm    MV E/A 0.6     MV max PG 3.00 mmHg    MV mean PG 1.00 mmHg    MVA(P1/2t) 2.86 cm2    MV A max mac 96.0 cm/sec    MV E max mac 63.0 cm/sec    TR max mac 237 cm/sec   Green Top (Gel)   Result Value Ref Range    Extra Tube Hold for add-ons.    Lavender Top   Result Value Ref Range    Extra Tube hold for add-on    Gold Top - SST   Result Value Ref Range    Extra Tube Hold for add-ons.    Light Blue Top   Result Value Ref Range    Extra Tube Hold for add-ons.      Medications   sodium chloride 0.9 % flush 10 mL (10 mL Intravenous Given 6/10/22 0848)   sodium chloride 0.9 % flush 10 mL (has no administration in time range)   atorvastatin (LIPITOR) tablet 80 mg (80 mg Oral Given 6/9/22 2011)   Enoxaparin Sodium (LOVENOX) syringe 40 mg (40 mg Subcutaneous Given 6/10/22 0847)   hydrALAZINE (APRESOLINE) tablet 25 mg (has no administration in time range)   ondansetron (ZOFRAN) injection 4 mg (4 mg Intravenous Given 6/10/22 0859)   ondansetron (ZOFRAN) injection 4 mg (has no administration in time range)   acetaminophen (TYLENOL) tablet 650 mg (650 mg Oral Given 6/10/22 0856)   acetaminophen (TYLENOL) 160 MG/5ML solution 650 mg (650 mg Oral Not Given 6/9/22 1926)   lisinopril  (PRINIVIL,ZESTRIL) tablet 5 mg (5 mg Oral Given 6/10/22 0851)   levothyroxine (SYNTHROID, LEVOTHROID) tablet 150 mcg (150 mcg Oral Given 6/10/22 0630)   amLODIPine (NORVASC) tablet 2.5 mg (2.5 mg Oral Not Given 6/10/22 1012)   lidocaine 1% - EPINEPHrine 1:994912 (XYLOCAINE W/EPI) 1 %-1:855156 injection 10 mL (10 mL Injection Given 22 1207)   iopamidol (ISOVUE-370) 76 % injection 100 mL (100 mL Intravenous Given 22 1345)     Adult Transthoracic Echo Complete W/ Cont if Necessary Per Protocol (With Agitated Saline)    Result Date: 2022  · Estimated right ventricular systolic pressure from tricuspid regurgitation is normal (<35 mmHg). · The left ventricular cavity is borderline dilated. · Left ventricular ejection fraction appears to be 56 - 60%. · Left ventricular diastolic function is consistent with (grade I) impaired relaxation. · No evidence of significant valvular disease      EEG    Result Date: 2022  [address] [place] [address] [phone] ELECTROENCEPHALOGRAPHIC REPORT Patient: Gregoria Claire EEG # :   RMJ-W- : 1950  ID:      [id number]    Age: 71 y.o. female    Primary  Physician: Chele Meyer MD Technician:  Emily Marvin Ordering  Physician: Chele Meyer MD    Recording Date:  2022 Report  Date: 2022     History:  She is suspected to have seizures.  She has breast cancer, depression, hypertension and hypothyroidism. Medications: Lipitor and Lovenox Reading: EEG was obtained portable with video monitoring in her room during the waking and light stages of sleep as well as on hyperventilation and on photic stimulation. The dominant waking background activity consists of symmetric 20 to 45 µV 10 cps which were prominent on both parieto-occipital regions and were reactive to eye opening and closure.  There were mild to moderate diffuse symmetric irregular 10 to 30 µV beta activities of 25 to 35 cps which were prominent on both frontocentral regions.   There were symmetric vertex activities during the light stages of sleep.  There was no buildup during 3 minutes of hyperventilation.  There were no discernible responses on photic stimulation at various frequencies.  There was no amplitude asymmetry.  No paroxysmal discharges. Impression: Normal EEG during the waking and light stages of sleep. Hyperventilation and photic stimulation did not activate any abnormalities. There were no epileptiform discharges noted today. Electronically signed by Lester Lynn Jr., MD, 06/09/22, 9:57 PM EDT. Please note that portions of this note were completed with a voice recognition program. Some letter(s), word(s), phrase(s), and or sentence(s) may be inadvertently omitted, transcribed, or added that may change the concept or idea that is being portrayed by the author of this note or report.  This note or report have been reviewed and edited by the author.  However, the errors may recur and new errors may occur in the process when this note or report is being electronically saved.     CT Angiogram Neck    Result Date: 6/9/2022  PROCEDURE: CT ANGIOGRAM NECK  COMPARISON: None  INDICATIONS: Stroke, follow up  PROTOCOL:   Standard imaging protocol performed    RADIATION:      Automated exposure control was utilized to minimize radiation dose.  TECHNIQUE: After obtaining the patient's consent, CT images of the neck were obtained without and with non-ionic intravenous contrast material. Multi-planar reformatted/3-D images were created to optimize visualization of vascular anatomy. Unless otherwise stated in this report, all vascular stenoses involving the internal carotid arteries reported for this examination are derived by dividing the lesion diameter by the diameter of the normal internal carotid artery more distally.  FINDINGS:  LEFT INTERNAL CAROTID: No hemodynamically significant stenosis or dissection.  EXTERNAL CAROTID: No hemodynamically significant stenosis or dissection.   COMMON CAROTID: No hemodynamically significant stenosis or dissection.  VERTEBRAL: No hemodynamically significant stenosis or dissection.   RIGHT INTERNAL CAROTID: No hemodynamically significant stenosis or dissection.  EXTERNAL CAROTID: No hemodynamically significant stenosis or dissection.  COMMON CAROTID: No hemodynamically significant stenosis or dissection.  VERTEBRAL: Widely patent throughout, although diminutive in caliber and very faintly visualized in its distal V4 segment.  OTHER: An aberrant right subclavian artery is noted.       Major arterial vasculature within neck appears widely patent, with no hemodynamically significant stenosis or dissection.     IVELISSE LAZARO MD       Electronically Signed and Approved By: IVELISSE LAZARO MD on 6/09/2022 at 15:59             MRI Brain Without Contrast    Result Date: 6/9/2022  PROCEDURE: MRI BRAIN WO CONTRAST  COMPARISON: None  INDICATIONS: possible tia    TECHNIQUE: A variety of imaging planes and parameters were utilized for visualization of suspected pathology.  Images were performed without contrast.  FINDINGS:  Diffusion weighted images are negative for acute infarction.  There is no evidence of intracranial hemorrhage on susceptibility weighted imaging.  Mild T2 high signal is scattered in the cerebral white matter, likely representing small vessel ischemic disease in a patient of this age.  Intravenous contrast was not given to assess for abnormal enhancement.  No mass or mass-effect is evident.  The ventricles are normal in size and configuration.  The visualized extracranial soft tissues appear normal.  No focal osseous lesion is seen.        1. Mild small vessel ischemic changes in the white matter 2. No acute infarction or intracranial hemorrhage      Federico Samano M.D.       Electronically Signed and Approved By: Federico Samano M.D. on 6/09/2022 at 13:21             XR Chest 1 View    Result Date: 6/9/2022  PROCEDURE: XR CHEST 1 VW   COMPARISON: [facility], CR, CHEST PA/AP & LAT 2V, 7/19/2019, 14:27.  INDICATIONS: STROKE PROTOCOL  FINDINGS:   The lungs are well-expanded. The heart and pulmonary vasculature are within normal limits. No pleural effusions are identified. There are no active appearing infiltrates.  There is an old left 5th rib fracture.  IMPRESSION: No active disease.  JACQUELIN DIMAS MD       Electronically Signed and Approved By: JACQUELIN DIMAS MD on 6/09/2022 at 10:37             CT Angiogram Head w AI Analysis of LVO    Result Date: 6/9/2022  PROCEDURE: CT ANGIOGRAM HEAD W AI ANALYSIS OF LVO  COMPARISON: [facility], CT, CT HEAD WO CONTRAST STROKE PROTOCOL, 6/09/2022, 10:00.  INDICATIONS: POSS. CVA/SYNCOPE,AMS  PROTOCOL:   Standard imaging protocol performed    RADIATION:      Automated exposure control was utilized to minimize radiation dose. RAPID: CTA imaging was analyzed using RAPID AI to enable computer assisted triage notification to rapidly detect a large vessel occlusion (LVO) and shorten notification time  TECHNIQUE: After obtaining the patient's consent, CT images of the head were obtained without and with non-ionic contrast, and multi-planar/3-D imaging were created and interpreted to optimize visualization of vascular anatomy.   FINDINGS:  VASCULATURE: Normal.  No significant stenosis.  No visible aneurysm or vascular malformation.  VENTRICLES: Normal for age.  No enlargement or displacement.  CEREBRUM: Normal for age.  No excessive atrophy, mass, or hemorrhage, or abnormal enhancement.  CEREBELLUM: Normal for age.  No excessive atrophy, mass, or hemorrhage, or abnormal enhancement.  BRAINSTEM: Normal for age.  No excessive atrophy, mass, or hemorrhage, or abnormal enhancement.  BASAL CISTERNS: Normal.  No subarachnoid hemorrhage or effacement.  SKULL: Negative.        Major intracranial arterial vasculature appears widely patent, with no thrombus or aneurysm.     IVELISSE LAZARO MD        Electronically Signed and Approved By: IVELISSE LAZARO MD on 6/09/2022 at 16:01                  Procedures/EKGs:  Laceration Repair    Date/Time: 6/9/2022 10:55 AM  Performed by: Theo Mcknight DO  Authorized by: Theo Mcknight DO     Consent:     Consent obtained:  Verbal    Consent given by:  Patient    Risks, benefits, and alternatives were discussed: yes      Risks discussed:  Pain and infection  Universal protocol:     Patient identity confirmed:  Verbally with patient  Anesthesia:     Anesthesia method:  Local infiltration    Local anesthetic:  Lidocaine 1% WITH epi  Laceration details:     Location:  Scalp    Scalp location:  Occipital (Left)    Length (cm):  2.5  Pre-procedure details:     Preparation:  Patient was prepped and draped in usual sterile fashion  Exploration:     Imaging obtained: x-ray      Imaging outcome: foreign body not noted    Treatment:     Wound cleansed with: Betadine.  Skin repair:     Repair method:  Staples    Number of staples:  2  Post-procedure details:     Procedure completion:  Tolerated well, no immediate complications         EKG:    Rhythm: Sinus bradycardia.  Rate: 46.  Normal P waves/MD interval.  Normal QRS.  Normal axis.  Normal ST segments.  Prolonged QT/QTc.    EKG Comparison: N/A    Interpreted by me.        Medical Decision Making:                     MDM  Number of Diagnoses or Management Options     Amount and/or Complexity of Data Reviewed  Clinical lab tests: reviewed  Tests in the radiology section of CPT®: reviewed  Tests in the medicine section of CPT®: reviewed  Decide to obtain previous medical records or to obtain history from someone other than the patient: yes  Obtain history from someone other than the patient: yes  Review and summarize past medical records: yes  Discuss the patient with other providers: yes  Independent visualization of images, tracings, or specimens: yes    Patient Progress  Patient progress: improved       Final diagnoses:    Fall, initial encounter   Laceration of scalp, initial encounter   Altered mental status, unspecified altered mental status type        Disposition:  ED Disposition     ED Disposition   Decision to Admit    Condition   --    Comment   Level of Care: Telemetry [5]   Diagnosis: Syncope [331705]   Admitting Physician: BARBARA SALES [[id number]]   Attending Physician: BARBARA SALES [[id number]]               Documentation assistance provided by Ella Zhang acting as scribe for Dr. Theo Mcknight. Information recorded by the scribe was done at my direction and has been verified and validated by me.      Ella Zhang  06/09/22 1025       Ella Zhang  06/09/22 1026       Ella Zhang  06/09/22 1030       Ella Zhang  06/09/22 1106       Theo Mcknight DO  06/10/22 1016

## 2022-06-09 NOTE — ASSESSMENT & PLAN NOTE
On initial evaluation and reevaluation patient has become more sluggish, slow to respond, falling asleep very easily while sitting in wheelchair.  Patient is somewhat disoriented, and has started complaining of nausea.  It was decided based on these findings to have patient transported to the emergency room for further evaluation and management.  Patient agrees with the plan.  EMS will transport patient directly to the ER.  We will call ER and let them know patient is coming.    Patient report was called to RIZWANA Sloan triage nurse at [facility] emergency room at 0925.    EMS arrival at 0926, report given to EMS crew, they departed our facility at 0928.    At time of departure, patient was still arousable, with a light tap.  Still with nausea, no vomiting.  Still somewhat sluggish to respond and weak.

## 2022-06-09 NOTE — H&P
[facility]    Date of admission: 6/9/2022  Patient Name: Gregoria Claire   1950  Primary Care Physician:  Reggie Stockton APRN    Subjective     Chief Complaint   Patient presents with   • Altered Mental Status   • Fall   • Head Laceration       HPI:  Gregoria Claire is a 71 y.o. female who presents with confusion and fall episode from her bed..  Patient has a pmhx notable for palpitations and PVCs who is being evaluated as an outpatient cardiology.    Patient states last night she went to bed without any acute health issues or concerns.  States she was feeling fine the past couple of days but no acute changes in health or medications.  She is unclear about the exact timeline of events and what happened today but notes the first thing she recalls is waking up on the ground with head pain and blood \"everywhere, \".  She lives alone and apparently was able to call a neighbor who helped drive her to her PCPs office for further evaluation.  Patient denies recalling falling out of bed, chest pain, palpitations, any presyncopal symptoms as the first thing she recalls is waking up on the ground.  Denies any bowel or bladder incontinence.  Is unaware of any seizures.  Was reportedly having confusion and difficulty with memory initially following this.  At time evaluation of the PCP she was still lethargic/having confusion and given all these concerns was referred to the ED for further evaluation.  Patient had her head laceration repaired by ED physician.  Apart from confusion of the timeline of the events from earlier denies any other memory impairment at this time.  Notes a headache that she partially attributes to not having any caffeine today but also from the pain in the left side of her head.  Denies any acute vision changes, numbness, tingling.  Does note a approximately 1 year history of dizziness with positional changes.  Denies any prior syncopal episodes.  Denies any " history of A. Fib      Review of Systems  All systems were reviewed and negative except as noted in HPI    Personal History     Past Medical History:  palpitations/pvcs,   hypothyroidism,   hypertension,   HLD,   mood disorder on trazodone and venlafaxine and Lamictal.      Active Ambulatory Problems     Diagnosis Date Noted   • History of breast cancer 04/14/2022   • Primary hypertension 04/14/2022   • Hypothyroidism 04/14/2022   • Palpitations 04/14/2022   • Dysuria 04/14/2022   • Acute cystitis without hematuria 04/14/2022   • Recent head trauma 06/09/2022     Resolved Ambulatory Problems     Diagnosis Date Noted   • No Resolved Ambulatory Problems     Past Medical History:   Diagnosis Date   • Breast cancer (HCC)    • Depression    • Hypertension    • Hyperthyroidism          Past Surgical History:   Procedure Laterality Date   • HYSTERECTOMY     • MASTECTOMY          Family History:   No premature cad     Social History:   No smoking/alcohol or illicits. Lives alone    Home Medications:  lamoTRIgine, levothyroxine, lisinopril, lovastatin, oxybutynin, traZODone, and venlafaxine XR    Allergies:  No Known Allergies    Objective     Vitals:   Temp:  [97.7 °F (36.5 °C)-97.8 °F (36.6 °C)] 97.8 °F (36.6 °C)  Heart Rate:  [48-55] 48  Resp:  [15-16] 16  BP: (154-192)/(71-80) 183/71    Physical Exam    Constitutional: Awake, alert, tired, conversant    Eyes:  no scleral icterus, pupils equal round reactive to light   HENT: NCAT apart from left occiput laceration with repair and no acute bleeding at this time, he has multiple EEG electrodes on currently, mucous membranes moist   Neck: Supple, no thyromegaly   Respiratory: Clear to auscultation bilaterally, nonlabored respirations    Cardiovascular: Sinus with bradycardia in the 50s, no murmurs, rubs, or gallops, no current PVCs on monitor bedside   Gastrointestinal: abdomen is soft, nontender, nondistended, +BS   Musculoskeletal: strength symmetric/equal, no cyanosis to  extremities   Psychiatric: Appropriate affect, cooperative, no si/hi   Neurologic: Oriented x 3, moves all extremities spontaneously, speech clear   Skin: No rashes or lesions noted, warm/dry     Result Review    Vital signs, labs and any relevant imaging reviewed.     Patient had a cardiac catheterization in 2019 with normal coronaries reported, 5/2019 echocardiogram with mild TR, EF 65%.  Prior Holter from 5/2019 showed occasional PACs and frequent PVCs with a few short runs of SVT.  Apparently in the 4/25/2022 cardiology appointment a Holter monitor was to be obtained at that time along with a stress test and repeat echocardiogram.  Does not appear these were completed as no results are available in the chart    EKG obtained in the ER upon review shows sinus rhythm with mild bradycardia to 46, borderline QTC of 497, no acute ST elevations   Assessment / Plan     Fall with head trauma  Questionable cardiogenic syncope versus seizure versus CVA versus postconcussive syndrome versus hypertensive emergency  History of frequent palpitations and PVCs  Borderline QTC prolongation  Hypothyroidism  Hypertension  Hyperlipidemia  Mood disorder    -Check MRI brain, was pending outpatient echo and will obtain inpatient, CTA head and neck to complete further stroke evaluation  - Check thyroid studies.  TSH notably elevated, will increase home Synthroid to 150 mcg daily and will need outpatient monitoring for further titration  -Check orthostatic vital signs  -Continue telemetry monitoring  -Blood pressure notably elevated 190/75  -Bradycardia limiting beta-blocker use which would likely be preferable given palpitations and PVC history.  Start on hydralazine as needed with parameters will gradually lower blood pressure.  Resume home lisinopril, but is only on 2.5 mg daily reported and will increase to 5 mg for now.    *cardiology consult to assist with evaluation as patient otherwise had extensive work-up pending for today  anyways and current symptoms may have contributed to her fall with head trauma.  Has seen Dr. Cui in 2019, but not recently.  -Check magnesium level, QTC borderline prolonged and patient is on trazodone, venlafaxine, Lamictal we will need to monitor closely.  -Acetaminophen as needed for headache likely in setting of fall and elevated blood pressure  Zofran as needed nausea  - Aspirin, statin for now.  Is on lovastatin as outpatient.  - Neurology consult/Dr. Lynn notified in ED  - Check EEG given seizure concerns and confusion seems to be resolving.  May be postictal in nature  - PT and OT.  - Wound care for scalp laceration which was repaired in the ED    Diet: Cardiac after bedside speech eval  DVT Prophylaxis: Heparin  Code: Full    Dispo pending additional work-up and results of testing

## 2022-06-10 ENCOUNTER — TELEPHONE (OUTPATIENT)
Dept: CARDIOLOGY | Facility: CLINIC | Age: 72
End: 2022-06-10

## 2022-06-10 VITALS
RESPIRATION RATE: 16 BRPM | WEIGHT: 168.65 LBS | BODY MASS INDEX: 26.47 KG/M2 | DIASTOLIC BLOOD PRESSURE: 52 MMHG | OXYGEN SATURATION: 99 % | HEIGHT: 67 IN | SYSTOLIC BLOOD PRESSURE: 119 MMHG | TEMPERATURE: 98.1 F | HEART RATE: 53 BPM

## 2022-06-10 LAB
ANION GAP SERPL CALCULATED.3IONS-SCNC: 11.5 MMOL/L (ref 5–15)
BACTERIA SPEC AEROBE CULT: NORMAL
BASOPHILS # BLD AUTO: 0.06 10*3/MM3 (ref 0–0.2)
BASOPHILS NFR BLD AUTO: 0.9 % (ref 0–1.5)
BUN SERPL-MCNC: 18 MG/DL (ref 8–23)
BUN/CREAT SERPL: 24.7 (ref 7–25)
CALCIUM SPEC-SCNC: 9.6 MG/DL (ref 8.6–10.5)
CHLORIDE SERPL-SCNC: 106 MMOL/L (ref 98–107)
CHOLEST SERPL-MCNC: 242 MG/DL (ref 0–200)
CO2 SERPL-SCNC: 24.5 MMOL/L (ref 22–29)
CREAT SERPL-MCNC: 0.73 MG/DL (ref 0.57–1)
DEPRECATED RDW RBC AUTO: 44 FL (ref 37–54)
EGFRCR SERPLBLD CKD-EPI 2021: 88 ML/MIN/1.73
EOSINOPHIL # BLD AUTO: 0.28 10*3/MM3 (ref 0–0.4)
EOSINOPHIL NFR BLD AUTO: 4 % (ref 0.3–6.2)
ERYTHROCYTE [DISTWIDTH] IN BLOOD BY AUTOMATED COUNT: 12.9 % (ref 12.3–15.4)
GLUCOSE SERPL-MCNC: 138 MG/DL (ref 65–99)
HBA1C MFR BLD: 5.6 % (ref 4.8–5.6)
HCT VFR BLD AUTO: 40.7 % (ref 34–46.6)
HDLC SERPL-MCNC: 52 MG/DL (ref 40–60)
HGB BLD-MCNC: 12.7 G/DL (ref 12–15.9)
IMM GRANULOCYTES # BLD AUTO: 0.04 10*3/MM3 (ref 0–0.05)
IMM GRANULOCYTES NFR BLD AUTO: 0.6 % (ref 0–0.5)
LDLC SERPL CALC-MCNC: 161 MG/DL (ref 0–100)
LDLC/HDLC SERPL: 3.05 {RATIO}
LYMPHOCYTES # BLD AUTO: 1.22 10*3/MM3 (ref 0.7–3.1)
LYMPHOCYTES NFR BLD AUTO: 17.4 % (ref 19.6–45.3)
MAGNESIUM SERPL-MCNC: 2.1 MG/DL (ref 1.6–2.4)
MCH RBC QN AUTO: 28.9 PG (ref 26.6–33)
MCHC RBC AUTO-ENTMCNC: 31.2 G/DL (ref 31.5–35.7)
MCV RBC AUTO: 92.7 FL (ref 79–97)
MONOCYTES # BLD AUTO: 0.56 10*3/MM3 (ref 0.1–0.9)
MONOCYTES NFR BLD AUTO: 8 % (ref 5–12)
NEUTROPHILS NFR BLD AUTO: 4.87 10*3/MM3 (ref 1.7–7)
NEUTROPHILS NFR BLD AUTO: 69.1 % (ref 42.7–76)
NRBC BLD AUTO-RTO: 0 /100 WBC (ref 0–0.2)
PHOSPHATE SERPL-MCNC: 4.4 MG/DL (ref 2.5–4.5)
PLATELET # BLD AUTO: 380 10*3/MM3 (ref 140–450)
PMV BLD AUTO: 9.6 FL (ref 6–12)
POTASSIUM SERPL-SCNC: 4 MMOL/L (ref 3.5–5.2)
RBC # BLD AUTO: 4.39 10*6/MM3 (ref 3.77–5.28)
SODIUM SERPL-SCNC: 142 MMOL/L (ref 136–145)
TRIGL SERPL-MCNC: 158 MG/DL (ref 0–150)
TROPONIN T SERPL-MCNC: <0.01 NG/ML (ref 0–0.03)
VLDLC SERPL-MCNC: 29 MG/DL (ref 5–40)
WBC NRBC COR # BLD: 7.03 10*3/MM3 (ref 3.4–10.8)

## 2022-06-10 PROCEDURE — 83735 ASSAY OF MAGNESIUM: CPT | Performed by: INTERNAL MEDICINE

## 2022-06-10 PROCEDURE — 84484 ASSAY OF TROPONIN QUANT: CPT | Performed by: INTERNAL MEDICINE

## 2022-06-10 PROCEDURE — 97165 OT EVAL LOW COMPLEX 30 MIN: CPT

## 2022-06-10 PROCEDURE — 96372 THER/PROPH/DIAG INJ SC/IM: CPT

## 2022-06-10 PROCEDURE — 80048 BASIC METABOLIC PNL TOTAL CA: CPT | Performed by: INTERNAL MEDICINE

## 2022-06-10 PROCEDURE — 97161 PT EVAL LOW COMPLEX 20 MIN: CPT

## 2022-06-10 PROCEDURE — 80061 LIPID PANEL: CPT | Performed by: INTERNAL MEDICINE

## 2022-06-10 PROCEDURE — 25010000002 ENOXAPARIN PER 10 MG: Performed by: INTERNAL MEDICINE

## 2022-06-10 PROCEDURE — G0378 HOSPITAL OBSERVATION PER HR: HCPCS

## 2022-06-10 PROCEDURE — 83036 HEMOGLOBIN GLYCOSYLATED A1C: CPT | Performed by: INTERNAL MEDICINE

## 2022-06-10 PROCEDURE — 84100 ASSAY OF PHOSPHORUS: CPT | Performed by: INTERNAL MEDICINE

## 2022-06-10 PROCEDURE — 96375 TX/PRO/DX INJ NEW DRUG ADDON: CPT

## 2022-06-10 PROCEDURE — 85025 COMPLETE CBC W/AUTO DIFF WBC: CPT | Performed by: INTERNAL MEDICINE

## 2022-06-10 PROCEDURE — 36415 COLL VENOUS BLD VENIPUNCTURE: CPT | Performed by: INTERNAL MEDICINE

## 2022-06-10 PROCEDURE — 25010000002 ONDANSETRON PER 1 MG: Performed by: INTERNAL MEDICINE

## 2022-06-10 PROCEDURE — 99203 OFFICE O/P NEW LOW 30 MIN: CPT | Performed by: INTERNAL MEDICINE

## 2022-06-10 PROCEDURE — 99217 PR OBSERVATION CARE DISCHARGE MANAGEMENT: CPT | Performed by: INTERNAL MEDICINE

## 2022-06-10 PROCEDURE — 96376 TX/PRO/DX INJ SAME DRUG ADON: CPT

## 2022-06-10 PROCEDURE — 25010000002 KETOROLAC TROMETHAMINE PER 15 MG: Performed by: INTERNAL MEDICINE

## 2022-06-10 RX ORDER — PROCHLORPERAZINE EDISYLATE 5 MG/ML
5 INJECTION INTRAMUSCULAR; INTRAVENOUS EVERY 6 HOURS PRN
Status: DISCONTINUED | OUTPATIENT
Start: 2022-06-10 | End: 2022-06-11 | Stop reason: HOSPADM

## 2022-06-10 RX ORDER — KETOROLAC TROMETHAMINE 15 MG/ML
15 INJECTION, SOLUTION INTRAMUSCULAR; INTRAVENOUS ONCE
Status: COMPLETED | OUTPATIENT
Start: 2022-06-10 | End: 2022-06-10

## 2022-06-10 RX ORDER — LISINOPRIL 5 MG/1
5 TABLET ORAL
Qty: 30 TABLET | Refills: 0 | Status: SHIPPED | OUTPATIENT
Start: 2022-06-11 | End: 2022-06-16 | Stop reason: SDUPTHER

## 2022-06-10 RX ORDER — VENLAFAXINE HYDROCHLORIDE 37.5 MG/1
37.5 CAPSULE, EXTENDED RELEASE ORAL DAILY
Status: DISCONTINUED | OUTPATIENT
Start: 2022-06-10 | End: 2022-06-11 | Stop reason: HOSPADM

## 2022-06-10 RX ORDER — CHOLECALCIFEROL (VITAMIN D3) 125 MCG
5 CAPSULE ORAL NIGHTLY
Status: DISCONTINUED | OUTPATIENT
Start: 2022-06-10 | End: 2022-06-11 | Stop reason: HOSPADM

## 2022-06-10 RX ORDER — MECLIZINE HYDROCHLORIDE 25 MG/1
25 TABLET ORAL 3 TIMES DAILY PRN
Status: DISCONTINUED | OUTPATIENT
Start: 2022-06-10 | End: 2022-06-11 | Stop reason: HOSPADM

## 2022-06-10 RX ORDER — LEVOTHYROXINE SODIUM 0.15 MG/1
150 TABLET ORAL
Qty: 30 TABLET | Refills: 0 | Status: SHIPPED | OUTPATIENT
Start: 2022-06-11 | End: 2022-06-16 | Stop reason: SDUPTHER

## 2022-06-10 RX ORDER — TRAZODONE HYDROCHLORIDE 50 MG/1
75 TABLET ORAL NIGHTLY
Status: DISCONTINUED | OUTPATIENT
Start: 2022-06-10 | End: 2022-06-11 | Stop reason: HOSPADM

## 2022-06-10 RX ORDER — ACETAMINOPHEN 325 MG/1
650 TABLET ORAL EVERY 6 HOURS PRN
Qty: 100 TABLET | Refills: 0 | Status: SHIPPED | OUTPATIENT
Start: 2022-06-10 | End: 2022-10-18

## 2022-06-10 RX ORDER — ONDANSETRON 4 MG/1
4 TABLET, FILM COATED ORAL EVERY 8 HOURS PRN
Qty: 12 TABLET | Refills: 0 | Status: SHIPPED | OUTPATIENT
Start: 2022-06-10 | End: 2022-10-18

## 2022-06-10 RX ORDER — LAMOTRIGINE 25 MG/1
75 TABLET ORAL DAILY
Status: DISCONTINUED | OUTPATIENT
Start: 2022-06-10 | End: 2022-06-11 | Stop reason: HOSPADM

## 2022-06-10 RX ORDER — CHOLECALCIFEROL (VITAMIN D3) 125 MCG
5 CAPSULE ORAL NIGHTLY
Qty: 30 EACH | Refills: 0 | Status: SHIPPED | OUTPATIENT
Start: 2022-06-10 | End: 2022-06-16 | Stop reason: SDUPTHER

## 2022-06-10 RX ORDER — OXYBUTYNIN CHLORIDE 5 MG/1
5 TABLET ORAL 2 TIMES DAILY
Status: DISCONTINUED | OUTPATIENT
Start: 2022-06-10 | End: 2022-06-11 | Stop reason: HOSPADM

## 2022-06-10 RX ORDER — AMLODIPINE BESYLATE 2.5 MG/1
2.5 TABLET ORAL
Status: DISCONTINUED | OUTPATIENT
Start: 2022-06-10 | End: 2022-06-10

## 2022-06-10 RX ORDER — MECLIZINE HYDROCHLORIDE 25 MG/1
25 TABLET ORAL 3 TIMES DAILY PRN
Qty: 15 TABLET | Refills: 0 | Status: SHIPPED | OUTPATIENT
Start: 2022-06-10 | End: 2022-10-18

## 2022-06-10 RX ORDER — KETOROLAC TROMETHAMINE 15 MG/ML
15 INJECTION, SOLUTION INTRAMUSCULAR; INTRAVENOUS EVERY 6 HOURS PRN
Status: DISCONTINUED | OUTPATIENT
Start: 2022-06-10 | End: 2022-06-11 | Stop reason: HOSPADM

## 2022-06-10 RX ADMIN — LAMOTRIGINE 75 MG: 25 TABLET ORAL at 11:24

## 2022-06-10 RX ADMIN — VENLAFAXINE HYDROCHLORIDE 37.5 MG: 37.5 CAPSULE, EXTENDED RELEASE ORAL at 11:24

## 2022-06-10 RX ADMIN — ENOXAPARIN SODIUM 40 MG: 100 INJECTION SUBCUTANEOUS at 08:47

## 2022-06-10 RX ADMIN — LEVOTHYROXINE SODIUM 150 MCG: 0.15 TABLET ORAL at 06:30

## 2022-06-10 RX ADMIN — ONDANSETRON 4 MG: 2 INJECTION INTRAMUSCULAR; INTRAVENOUS at 08:59

## 2022-06-10 RX ADMIN — ACETAMINOPHEN 650 MG: 325 TABLET ORAL at 08:56

## 2022-06-10 RX ADMIN — KETOROLAC TROMETHAMINE 15 MG: 15 INJECTION, SOLUTION INTRAMUSCULAR; INTRAVENOUS at 11:23

## 2022-06-10 RX ADMIN — ACETAMINOPHEN 650 MG: 325 TABLET ORAL at 01:30

## 2022-06-10 RX ADMIN — Medication 10 ML: at 08:48

## 2022-06-10 RX ADMIN — LISINOPRIL 5 MG: 5 TABLET ORAL at 08:51

## 2022-06-10 RX ADMIN — OXYBUTYNIN CHLORIDE 5 MG: 5 TABLET ORAL at 11:24

## 2022-06-10 NOTE — THERAPY EVALUATION
Patient Name: Gregoria Claire  : 1950    MRN: [id number]                              Today's Date: 6/10/2022       Admit Date: 2022    Visit Dx:     ICD-10-CM ICD-9-CM   1. Fall, initial encounter  W19.XXXA E888.9   2. Laceration of scalp, initial encounter  S01.01XA 873.0   3. Altered mental status, unspecified altered mental status type  R41.82 780.97   4. Difficulty in walking  R26.2 719.7   5. Decreased activities of daily living (ADL)  Z78.9 V49.89     Patient Active Problem List   Diagnosis   • History of breast cancer   • Primary hypertension   • Hypothyroidism   • Palpitations   • Dysuria   • Acute cystitis without hematuria   • Recent head trauma   • Syncope     Past Medical History:   Diagnosis Date   • Breast cancer (HCC)    • Depression    • Hypertension    • Hyperthyroidism      Past Surgical History:   Procedure Laterality Date   • HYSTERECTOMY     • MASTECTOMY        General Information     Row Name 06/10/22 1126          OT Time and Intention    Document Type evaluation  -AV     Mode of Treatment individual therapy;occupational therapy  -AV     Row Name 06/10/22 1126          General Information    Patient Profile Reviewed yes  -AV     Prior Level of Function independent:;ADL's;home management;all household mobility  Stands to shower/tub.  Stands at sink to groom.  Elevated commode.  Ambulates without device.  No home oxygen.  -AV     Existing Precautions/Restrictions no known precautions/restrictions  -AV     Barriers to Rehab none identified  -AV     Row Name 06/10/22 1126          Occupational Profile    Reason for Services/Referral (Occupational Profile) Patient is a 71year old female admitted to [facility] on 2022 after falling out of bed.  She is currently in PCU 2/on room air.   OT consulted to evaluate for ADL needs.  No previous OT services for current condition.  -AV     Row Name 06/10/22 1126          Living Environment    People in Home alone  -AV     Row  Name 06/10/22 1126          Home Main Entrance    Number of Stairs, Main Entrance none  -AV     Row Name 06/10/22 1126          Stairs Within Home, Primary    Number of Stairs, Within Home, Primary none  -AV     Row Name 06/10/22 1126          Cognition    Orientation Status (Cognition) --  Patient is alert, pleasant and cooperative- able to retain information and follow commands.  -AV     Row Name 06/10/22 1126          Safety Issues, Functional Mobility    Impairments Affecting Function (Mobility) --  NA  -AV           User Key  (r) = Recorded By, (t) = Taken By, (c) = Cosigned By    Initials Name Provider Type    Javon Harris OT Occupational Therapist                 Mobility/ADL's     Row Name 06/10/22 1127          Transfers    Comment, (Transfers) Independent all transfers  -AV     Row Name 06/10/22 1127          Activities of Daily Living    BADL Assessment/Intervention --  Patient is independent with all basic ADLs at this time.  -AV           User Key  (r) = Recorded By, (t) = Taken By, (c) = Cosigned By    Initials Name Provider Type    Javon Harris OT Occupational Therapist               Obj/Interventions     Row Name 06/10/22 1128          Sensory Assessment (Somatosensory)    Sensory Assessment (Somatosensory) UE sensation intact  -AV     Row Name 06/10/22 1128          Vision Assessment/Intervention    Visual Impairment/Limitations WFL  Glasses  -AV     Row Name 06/10/22 1128          Range of Motion Comprehensive    General Range of Motion bilateral upper extremity ROM WFL  AROM  -AV     Row Name 06/10/22 1128          Strength Comprehensive (MMT)    Comment, General Manual Muscle Testing (MMT) Assessment Upper extremities equal at 4+/5  -AV     Row Name 06/10/22 1128          Motor Skills    Motor Skills coordination;functional endurance  -AV     Coordination WFL  Left dominant  -AV     Functional Endurance WFL  -AV     Row Name 06/10/22 1128          Balance    Dynamic Standing Balance  independent  -AV           User Key  (r) = Recorded By, (t) = Taken By, (c) = Cosigned By    Initials Name Provider Type    Javon Harris, OT Occupational Therapist               Goals/Plan    No documentation.                Clinical Impression     Row Name 06/10/22 1128          Pain Assessment    Pretreatment Pain Rating 0/10 - no pain  -AV     Posttreatment Pain Rating 0/10 - no pain  -AV     Row Name 06/10/22 1128          Plan of Care Review    Plan of Care Reviewed With patient  -AV     Progress no change  -AV     Outcome Evaluation Skilled OT services are not indicated at this time as patient has had no significant change in functional status.  DC OT with patient in agreement.  -AV     Row Name 06/10/22 1128          Therapy Assessment/Plan (OT)    Patient/Family Therapy Goal Statement (OT) NA  -AV     Rehab Potential (OT) --  NA  -AV     Therapy Frequency (OT) evaluation only  -AV     Row Name 06/10/22 1128          Therapy Plan Review/Discharge Plan (OT)    Anticipated Discharge Disposition (OT) home  -AV     Row Name 06/10/22 1128          Vital Signs    O2 Delivery Pre Treatment room air  -AV     O2 Delivery Intra Treatment room air  -AV     O2 Delivery Post Treatment room air  -AV           User Key  (r) = Recorded By, (t) = Taken By, (c) = Cosigned By    Initials Name Provider Type    Javon Harris OT Occupational Therapist               Outcome Measures     Row Name 06/10/22 1129          How much help from another is currently needed...    Putting on and taking off regular lower body clothing? 4  -AV     Bathing (including washing, rinsing, and drying) 4  -AV     Toileting (which includes using toilet bed pan or urinal) 4  -AV     Putting on and taking off regular upper body clothing 4  -AV     Taking care of personal grooming (such as brushing teeth) 4  -AV     Eating meals 4  -AV     AM-PAC 6 Clicks Score (OT) 24  -AV     Row Name 06/10/22 1000 06/10/22 0735       How much help from  another person do you currently need...    Turning from your back to your side while in flat bed without using bedrails? 4  -ADAM 4  -HW    Moving from lying on back to sitting on the side of a flat bed without bedrails? 4  -ADAM 4  -HW    Moving to and from a bed to a chair (including a wheelchair)? 4  -ADAM 4  -HW    Standing up from a chair using your arms (e.g., wheelchair, bedside chair)? 4  -ADAM 4  -HW    Climbing 3-5 steps with a railing? 3  -ADAM 3  -HW    To walk in hospital room? 4  -ADAM 4  -HW    AM-PAC 6 Clicks Score (PT) 23  -ADAM 23  -HW    Highest level of mobility 7 --> Walked 25 feet or more  -ADAM 7 --> Walked 25 feet or more  -    Row Name 06/10/22 1129          Functional Assessment    Outcome Measure Options AM-PAC 6 Clicks Daily Activity (OT);Optimal Instrument  -AV     Row Name 06/10/22 1129          Optimal Instrument    Optimal Instrument Optimal - 3  -AV     Bending/Stooping 1  -AV     Standing 1  -AV     Reaching 1  -AV     From the list, choose the 3 activities you would most like to be able to do without any difficulty Bending/stooping;Standing;Reaching  -AV     Total Score Optimal - 3 3  -AV           User Key  (r) = Recorded By, (t) = Taken By, (c) = Cosigned By    Initials Name Provider Type    AV Javon Marshall, OT Occupational Therapist    Naresh Red, PT Physical Therapist    HW Lesly Farfan, RN Registered Nurse                  OT Recommendation and Plan  Therapy Frequency (OT): evaluation only  Plan of Care Review  Plan of Care Reviewed With: patient  Progress: no change  Outcome Evaluation: Skilled OT services are not indicated at this time as patient has had no significant change in functional status.  DC OT with patient in agreement.     Time Calculation:    Time Calculation- OT     Row Name 06/10/22 1130             Time Calculation- OT    OT Received On 06/10/22  -AV              Untimed Charges    OT Eval/Re-eval Minutes 35  -AV              Total Minutes    Untimed Charges  Total Minutes 35  -AV       Total Minutes 35  -AV            User Key  (r) = Recorded By, (t) = Taken By, (c) = Cosigned By    Initials Name Provider Type    Javon Harris OT Occupational Therapist              Therapy Charges for Today     Code Description Service Date Service Provider Modifiers Qty    [id number]  OT EVAL LOW COMPLEXITY 3 6/10/2022 Javon Marshall OT GO 1               Javon Marshall OT  6/10/2022

## 2022-06-10 NOTE — DISCHARGE SUMMARY
[facility]         HOSPITALIST  DISCHARGE SUMMARY    Patient Name: Gregoria Claire  : 1950  MRN: [id number]    Date of Admission: 2022  Date of Discharge:  06/10/22    Primary Care Physician: Reggie Stockton APRN    Consults     Date and Time Order Name Status Description    6/10/2022  7:55 AM Inpatient Cardiology Consult      2022 11:41 AM Inpatient Neurology Consult Stroke      2022 11:41 AM Inpatient Neurology Consult General      2022 11:07 AM Hospitalist (on-call MD unless specified)            Final Diagnosis:  Fall with head trauma  REM Sleep Disorder  Hypertensive emergency  History of frequent palpitations and PVCs  Borderline QTC prolongation  Hypothyroidism, with elevated TSH  Hypertension  Hyperlipidemia  Mood disorder     Hospital Course     Hospital Course:  71 y.o. female who presents with confusion and fall episode from her bed..  Patient has a pmhx notable for palpitations and PVCs who is being evaluated as an outpatient cardiology.  Patient initially was unclear about episodes preceding her fall and head trauma.  Cardiac and stroke work-up and EEG were obtained and unremarkable for etiology.  Neurology assisted with initial evaluation.  Ultimately it was discovered that patient has been having issues with REM sleep disorder/severe nightmares and night terrors which have been waking her up at night and often causing her to be disoriented.  Suspect that this may have contributed to her fall episode.  Patient blood pressure also notably elevated on admission and this improved with titrating her home blood pressure medication, but will need to be reassessed as outpatient for further changes.  Telemetry monitoring revealed already known PVCs, rate was stable in their 50s and unfortunately due to the bradycardia was unable to initiate any beta-blocker therapy.  She will follow-up with cardiology to complete additional outpatient evaluation as previously ordered.   Thyroid studies also notable for elevated TSH and her home Synthroid dose was increased.  She had this changed about 6 weeks ago she notes and may need further titration.  Patient without further symptoms, PT OT did not recommend any inpatient rehab and patient felt stable and interested in going home.    Patient discharged in stable condition with close PCP follow up.   Return precautions and follow up discussed and patient voiced agreement and understanding of treatment plan.     DISCHARGE Follow Up Recommendations for labs and diagnostics:   -Follow-up response to melatonin for REM sleep disorder, low-dose Ativan may be considered in the future to help with this if still having significant issues.  Patient instructed to titrate her melatonin as well if no response on initial dosing  -Follow blood pressure and titrate further outpatient  -Repeat thyroid studies as outpatient, Synthroid increased for discharge    CODE STATUS:  Code Status and Medical Interventions:   Ordered at: 06/09/22 1142     Code Status (Patient has no pulse and is not breathing):    CPR (Attempt to Resuscitate)     Medical Interventions (Patient has pulse or is breathing):    Full Support           Day of Discharge     Vital Signs:  Temp:  [97.4 °F (36.3 °C)-98.9 °F (37.2 °C)] 98.1 °F (36.7 °C)  Heart Rate:  [48-63] 53  Resp:  [16-22] 16  BP: (117-203)/(45-77) 119/52    Physical Exam  Gen: awake, resting in bed, conversant  HENT: NCAT, mmm  Resp: CTAB, normal respiratory effort  CV: RRR, no LE pitting edema  GI: Abdomen soft, NT, ND, no guarding, +BS  Psych: appropriate mood and affect, aox3  Skin: warm, dry        Discharge Details        Discharge Medications      New Medications      Instructions Start Date   acetaminophen 325 MG tablet  Commonly known as: TYLENOL   650 mg, Oral, Every 6 Hours PRN      meclizine 25 MG tablet  Commonly known as: ANTIVERT   25 mg, Oral, 3 Times Daily PRN      melatonin 5 MG tablet tablet   5 mg, Oral,  Nightly, If still having night terrors/sleep issues can increase dose to two pills daily      ondansetron 4 MG tablet  Commonly known as: Zofran   4 mg, Oral, Every 8 Hours PRN         Changes to Medications      Instructions Start Date   levothyroxine 150 MCG tablet  Commonly known as: SYNTHROID, LEVOTHROID  What changed:   medication strength  how much to take  when to take this   150 mcg, Oral, Every Early Morning   Start Date: June 11, 2022     lisinopril 5 MG tablet  Commonly known as: PRINIVIL,ZESTRIL  What changed:   medication strength  how much to take  when to take this   5 mg, Oral, Every 24 Hours Scheduled   Start Date: June 11, 2022        Continue These Medications      Instructions Start Date   lamoTRIgine 150 MG tablet  Commonly known as: LaMICtal   75 mg, Oral, Daily, 1/2 tab qd      lovastatin 20 MG tablet  Commonly known as: MEVACOR   20 mg, Oral, Daily With Dinner      oxybutynin 5 MG tablet  Commonly known as: DITROPAN   5 mg, Oral, 2 Times Daily, May cause dry mouth      traZODone 75 MG half tablet  Commonly known as: DESYREL   75 mg, Oral, Nightly      venlafaxine XR 37.5 MG 24 hr capsule  Commonly known as: EFFEXOR-XR   37.5 mg, Oral, Daily               Discharge Disposition:  Home or Self Care    Diet: Advance as tolerated    Discharge Activity: advance as tolerated    Future Appointments   Date Time Provider Department [place]   6/14/2022  1:30 PM [address]   7/26/2022 11:15 AM Kulwinder Zuniga MD [facility] [place] [facility]   10/18/2022  1:00 PM Reggie Stockton APRN [facility]       Additional Instructions for the Follow-ups that You Need to Schedule     Discharge Follow-up with PCP   As directed       Currently Documented PCP:    Reggie Stockton APRN    PCP Phone Number:    [phone]     Follow Up Details: 3-5 days; rem sleep disorder/dizziness, HTN, palpitaitons follow up               Pertinent  and/or Most Recent Results       LAB RESULTS:      Lab 06/10/22  0426  06/09/22  0957   WBC 7.03 6.40   HEMOGLOBIN 12.7 13.8   HEMATOCRIT 40.7 42.7   PLATELETS 380 385   NEUTROS ABS 4.87 4.07   IMMATURE GRANS (ABS) 0.04 0.03   LYMPHS ABS 1.22 1.33   MONOS ABS 0.56 0.56   EOS ABS 0.28 0.32   MCV 92.7 90.9   PROTIME  --  12.2         Lab 06/10/22  0426 06/09/22  0957   SODIUM 142 142   POTASSIUM 4.0 4.3   CHLORIDE 106 106   CO2 24.5 25.7   ANION GAP 11.5 10.3   BUN 18 15   CREATININE 0.73 0.72   EGFR 88.0 89.5   GLUCOSE 138* 114*   CALCIUM 9.6 9.5   MAGNESIUM 2.1 2.4   PHOSPHORUS 4.4  --    HEMOGLOBIN A1C 5.60  --    TSH  --  25.620*         Lab 06/09/22 0957   TOTAL PROTEIN 7.0   ALBUMIN 4.40   GLOBULIN 2.6   ALT (SGPT) 19   AST (SGOT) 17   BILIRUBIN 0.2   ALK PHOS 56         Lab 06/10/22  0426 06/09/22  0957   TROPONIN T <0.010  --    PROTIME  --  12.2   INR  --  0.90         Lab 06/10/22  0426   CHOLESTEROL 242*   LDL CHOL 161*   HDL CHOL 52   TRIGLYCERIDES 158*             Brief Urine Lab Results  (Last result in the past 365 days)      Color   Clarity   Blood   Leuk Est   Nitrite   Protein   CREAT   Urine HCG        06/09/22 1042 Yellow   Clear   Large (3+)   Small (1+)   Negative   Negative               Microbiology Results (last 10 days)     Procedure Component Value - Date/Time    Urine Culture - Urine, Urine, Clean Catch [[id number]] Collected: 06/09/22 1042    Lab Status: Final result Specimen: Urine, Clean Catch Updated: 06/10/22 1054     Urine Culture 50,000 CFU/mL Mixed Noy Isolated    Narrative:      Specimen contains mixed organisms of questionable pathogenicity suggestive of contamination. If symptoms persist, suggest recollection.  Colonization of the urinary tract without infection is common. Treatment is discouraged unless the patient is symptomatic, pregnant, or undergoing an invasive urologic procedure.          RADIOLOGY:  CT Angiogram Neck    Result Date: 6/9/2022  Impression:  Major arterial vasculature within neck appears widely patent, with no hemodynamically  significant stenosis or dissection.     IVELISSE LAZARO MD       Electronically Signed and Approved By: IVELISSE LAZARO MD on 6/09/2022 at 15:59             MRI Brain Without Contrast    Result Date: 6/9/2022  Impression:   1. Mild small vessel ischemic changes in the white matter 2. No acute infarction or intracranial hemorrhage      Federico Samano M.D.       Electronically Signed and Approved By: Federico Samano M.D. on 6/09/2022 at 13:21             CT Angiogram Head w AI Analysis of LVO    Result Date: 6/9/2022  Impression:  Major intracranial arterial vasculature appears widely patent, with no thrombus or aneurysm.     IVELISSE LAZARO MD       Electronically Signed and Approved By: IVELISSE LAZARO MD on 6/09/2022 at 16:01                       Results for orders placed during the hospital encounter of 06/09/22    Adult Transthoracic Echo Complete W/ Cont if Necessary Per Protocol (With Agitated Saline)    Interpretation Summary  · Estimated right ventricular systolic pressure from tricuspid regurgitation is normal (<35 mmHg).  · The left ventricular cavity is borderline dilated.  · Left ventricular ejection fraction appears to be 56 - 60%.  · Left ventricular diastolic function is consistent with (grade I) impaired relaxation.  · No evidence of significant valvular disease      Labs Pending at Discharge:          Time spent on Discharge including face to face service: 45 minutes

## 2022-06-10 NOTE — THERAPY EVALUATION
Acute Care - Physical Therapy Initial Evaluation   [place]     Patient Name: Gregoria Claire  : 1950  MRN: [id number]  Today's Date: 6/10/2022   Admit date: 2022     Referring Physician: Chele Meyer MD     Surgery Date:* No surgery found *           Visit Dx:     ICD-10-CM ICD-9-CM   1. Fall, initial encounter  W19.XXXA E888.9   2. Laceration of scalp, initial encounter  S01.01XA 873.0   3. Altered mental status, unspecified altered mental status type  R41.82 780.97   4. Difficulty in walking  R26.2 719.7     Patient Active Problem List   Diagnosis   • History of breast cancer   • Primary hypertension   • Hypothyroidism   • Palpitations   • Dysuria   • Acute cystitis without hematuria   • Recent head trauma   • Syncope     Past Medical History:   Diagnosis Date   • Breast cancer (HCC)    • Depression    • Hypertension    • Hyperthyroidism      Past Surgical History:   Procedure Laterality Date   • HYSTERECTOMY     • MASTECTOMY       PT Assessment (last 12 hours)     PT Evaluation and Treatment     Row Name 06/10/22 1048          Physical Therapy Time and Intention    Subjective Information no complaints  -ADAM     Document Type evaluation  -ADAM     Mode of Treatment individual therapy;physical therapy  -ADAM     Patient Effort excellent  -ADAM     Symptoms Noted During/After Treatment other (see comments)  Light headed  -ADAM     Row Name 06/10/22 1048          General Information    Patient Profile Reviewed yes  -ADAM     Patient Observations alert;cooperative;agree to therapy  -ADAM     Prior Level of Function independent:;all household mobility;community mobility  -ADAM     Equipment Currently Used at Home none  -ADAM     Existing Precautions/Restrictions no known precautions/restrictions  -ADAM     Barriers to Rehab none identified  -ADAM     Row Name 06/10/22 1048          Living Environment    Current Living Arrangements home  -ADAM     People in Home alone  -ADAM     Row Name 06/10/22 1048          Home Use of  Assistive/Adaptive Equipment    Equipment Currently Used at Home none  -ADAM     Row Name 06/10/22 1048          Range of Motion (ROM)    Range of Motion ROM is WFL  -ADAM     Row Name 06/10/22 1048          Strength (Manual Muscle Testing)    Strength (Manual Muscle Testing) bilateral lower extremities  hip flexors 4/5  -ADAM     Row Name 06/10/22 1048          Bed Mobility    Bed Mobility supine-sit;sit-supine  -ADAM     Supine-Sit [place] (Bed Mobility) independent  -ADAM     Sit-Supine [place] (Bed Mobility) independent  -ADAM     Row Name 06/10/22 1048          Transfers    Transfers sit-stand transfer;stand-sit transfer  -ADAM     Sit-Stand [place] (Transfers) standby assist  -ADAM     Stand-Sit [place] (Transfers) standby assist  -ADAM     Row Name 06/10/22 1048          Sit-Stand Transfer    Assistive Device (Sit-Stand Transfers) walker, front-wheeled  -ADAM     Row Name 06/10/22 1048          Stand-Sit Transfer    Assistive Device (Stand-Sit Transfers) walker, front-wheeled  -ADAM     Row Name 06/10/22 1048          Gait/Stairs (Locomotion)    Gait/Stairs Locomotion gait/ambulation assistive device  -ADAM     Eight[place] Level (Gait) contact guard  -ADAM     Assistive Device (Gait) walker, front-wheeled  -ADAM     Distance in Feet (Gait) 120  -ADAM     Pattern (Gait) step-through  -ADAM     Row Name 06/10/22 1048          Balance    Balance Assessment standing dynamic balance  -ADAM     Dynamic Standing Balance standby assist  -ADAM     Position/Device Used, Standing Balance walker, front-wheeled  -ADAM     Row Name 06/10/22 1048          Plan of Care Review    Plan of Care Reviewed With patient  -ADAM     Outcome Evaluation Patient has no noted deficits in functional mobility. Patient reported being lightheaded, but was still able to ambulate household distances safely. Recommend followup with home health services.  -ADAM     Row Name 06/10/22 1048          Positioning and Restraints    Pre-Treatment Position in bed  -ADAM      Post Treatment Position bed  -ADAM     In Bed supine  -ADAM     Row Name 06/10/22 1048          Therapy Assessment/Plan (PT)    Patient/Family Therapy Goals Statement (PT) Return to normal.  -ADAM     Criteria for Skilled Interventions Met (PT) skilled treatment is necessary  -ADAM     Therapy Frequency (PT) evaluation only  -ADAM     Row Name 06/10/22 1048          Therapy Plan Review/Discharge Plan (PT)    Therapy Plan Review (PT) evaluation/treatment results reviewed  -ADAM           User Key  (r) = Recorded By, (t) = Taken By, (c) = Cosigned By    Initials Name Provider Type    Naresh Red, PT Physical Therapist                Physical Therapy Education                 Title: PT OT SLP Therapies (Done)     Topic: Physical Therapy (Done)     Point: Mobility training (Done)     Learning Progress Summary           Patient Acceptance, E, VU by ADAM at 6/10/2022 1100                               User Key     Initials Effective Dates Name Provider Type Discipline    ADAM 06/03/21 -  Naresh Hale PT Physical Therapist PT              PT Recommendation and Plan  Anticipated Discharge Disposition (PT): home with home health  Therapy Frequency (PT): evaluation only  Plan of Care Reviewed With: patient  Outcome Evaluation: Patient has no noted deficits in functional mobility. Patient reported being lightheaded, but was still able to ambulate household distances safely. Recommend followup with home health services.   Outcome Measures     Row Name 06/10/22 1000             How much help from another person do you currently need...    Turning from your back to your side while in flat bed without using bedrails? 4  -ADAM      Moving from lying on back to sitting on the side of a flat bed without bedrails? 4  -ADAM      Moving to and from a bed to a chair (including a wheelchair)? 4  -ADAM      Standing up from a chair using your arms (e.g., wheelchair, bedside chair)? 4  -ADAM      Climbing 3-5 steps with a railing? 3  -ADAM      To walk  in hospital room? 4  -ADAM      AM-PAC 6 Clicks Score (PT) 23  -ADAM            User Key  (r) = Recorded By, (t) = Taken By, (c) = Cosigned By    Initials Name Provider Type    Naresh Red, PT Physical Therapist                 Time Calculation:    PT Charges     Row Name 06/10/22 1048             Time Calculation    PT Received On 06/10/22  -ADAM              Untimed Charges    PT Eval/Re-eval Minutes 38  -ADAM              Total Minutes    Untimed Charges Total Minutes 38  -ADAM       Total Minutes 38  -ADAM            User Key  (r) = Recorded By, (t) = Taken By, (c) = Cosigned By    Initials Name Provider Type    Naresh Red, PT Physical Therapist              Therapy Charges for Today     Code Description Service Date Service Provider Modifiers Qty    [id number] HC PT EVAL LOW COMPLEXITY 3 6/10/2022 Naresh Hale, PT GP 1          PT G-Codes  AM-PAC 6 Clicks Score (PT): 23    Naresh Hale, PT  6/10/2022
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
## 2022-06-10 NOTE — PLAN OF CARE
Goal Outcome Evaluation:  Plan of Care Reviewed With: patient        Progress: no change  Outcome Evaluation: Skilled OT services are not indicated at this time as patient has had no significant change in functional status.  DC OT with patient in agreement.

## 2022-06-10 NOTE — CONSULTS
Cardiology Consult Note  [facility] PROGRESSIVE CARE UNIT 2          Patient Identification:  Gregoria Claire      [id number]  71 y.o.        female  1950           Reason for Consultation: Syncope    PCP: Reggie Stockton APRN    History of Present Illness:     Patient is a 71-year-old with hypertension and hypothyroidism who had an episode yesterday of falling out of bed.  She did not remember the specifics about what happened and went in to see her PCP who felt that her mental status was altered in general.  She denies having any prior syncopal episodes or presyncope no chest pain shortness of breath or other active complaints or problems.  She has had some some chronic mild right-sided chest discomfort.  She has not had any recent fever chills cough or shortness of breath problems.      Past History:  Past Medical History:   Diagnosis Date   • Breast cancer (HCC)    • Depression    • Hypertension    • Hyperthyroidism      Past Surgical History:   Procedure Laterality Date   • HYSTERECTOMY     • MASTECTOMY       No Known Allergies  Social History     Socioeconomic History   • Marital status:    Tobacco Use   • Smoking status: Never Smoker   • Smokeless tobacco: Never Used   Vaping Use   • Vaping Use: Never used   Substance and Sexual Activity   • Alcohol use: Never   • Drug use: Never   • Sexual activity: Defer     History reviewed. No pertinent family history.    Medications:  Prior to Admission medications    Medication Sig Start Date End Date Taking? Authorizing Provider   lamoTRIgine (LaMICtal) 150 MG tablet Take 75 mg by mouth Daily. 1/2 tab qd 3/8/22  Yes ProviderManjit MD   levothyroxine (SYNTHROID, LEVOTHROID) 125 MCG tablet Take 125 mcg by mouth Daily. 3/9/22  Yes ProviderManjit MD   lisinopril (PRINIVIL,ZESTRIL) 2.5 MG tablet Take 1 tablet by mouth Daily. 4/25/22  Yes Lucila Stark APRN   lovastatin (MEVACOR) 20 MG tablet Take 1 tablet by mouth Daily With  "Dinner. 4/25/22  Yes Lucila Stark APRN   oxybutynin (DITROPAN) 5 MG tablet Take 1 tablet by mouth 2 (Two) Times a Day. May cause dry mouth 4/14/22  Yes Reggie Stockton APRN   traZODone (DESYREL) 75 MG half tablet Take 75 mg by mouth Every Night.   Yes Provider, MD Manjit   venlafaxine XR (EFFEXOR-XR) 37.5 MG 24 hr capsule Take 37.5 mg by mouth Daily.   Yes Provider, Historical, MD      Current medications:  acetaminophen, 650 mg, Oral, Once  atorvastatin, 80 mg, Oral, Nightly  enoxaparin, 40 mg, Subcutaneous, Q24H  ketorolac, 15 mg, Intravenous, Once  lamoTRIgine, 75 mg, Oral, Daily  levothyroxine, 150 mcg, Oral, Q AM  lisinopril, 5 mg, Oral, Q24H  melatonin, 5 mg, Oral, Nightly  ondansetron, 4 mg, Intravenous, Once  oxybutynin, 5 mg, Oral, BID  traZODone, 75 mg, Oral, Nightly  venlafaxine XR, 37.5 mg, Oral, Daily      Current IV drips:       Review of Systems   Constitutional: Negative for chills, fever and weight loss.   HENT: Negative for congestion and nosebleeds.    Cardiovascular: Negative for orthopnea and paroxysmal nocturnal dyspnea.   Respiratory: Negative for cough and shortness of breath.    Endocrine: Negative for cold intolerance and heat intolerance.   Skin: Negative for rash.   Musculoskeletal: Negative for back pain and muscle weakness.   Gastrointestinal: Negative for abdominal pain, nausea and vomiting.   Genitourinary: Negative for dysuria and nocturia.   Neurological: Negative for dizziness and light-headedness.   Psychiatric/Behavioral: Positive for altered mental status. Negative for hallucinations.         Physical Exam    /52 (BP Location: Left arm, Patient Position: Lying)   Pulse 53   Temp 98.1 °F (36.7 °C) (Oral)   Resp 16   Ht 170.2 cm (67\")   Wt 76.5 kg (168 lb 10.4 oz)   LMP  (LMP Unknown)   SpO2 99%   BMI 26.41 kg/m²  Body mass index is 26.41 kg/m².   Oxygen saturation   @FLOWAN(10::1)@ SpO2  Min: 94 %  Max: 100 %    General Appearance:   · no acute " distress  · Alert and oriented x3  HENT:   · lips not cyanotic  · Atraumatic  Neck:  · thyroid not enlarged  · supple  Respiratory:  · no respiratory distress  · normal breath sounds  · no rales  Cardiovascular:  · no jugular venous distention  · regular rhythm  · apical impulse normal  · S1 normal, S2 normal  · no S3, no S4   · no murmur  · no rub, no thrill  · no carotid bruit  · pedal pulses normal  · lower extremity edema: none    Gastrointestinal:   · bowel sounds normal  · non-tender  · no hepatomegaly, no splenomegaly  Musculoskeletal:  · no clubbing of fingers.   · normocephalic, head atraumatic  Skin:   · warm, dry  · No rashes  Neuro/Psychiatric:  · normal mood and affect  · judgement and insight appropriate      Cardiographics:     ECG  (personally reviewed)            Telemetry:  (personally reviewed) normal sinus rhythm with some mild sinus bradycardia in the high 40s and intermittent PVCs     Results for orders placed during the hospital encounter of 06/09/22    Adult Transthoracic Echo Complete W/ Cont if Necessary Per Protocol (With Agitated Saline)    Interpretation Summary  · Estimated right ventricular systolic pressure from tricuspid regurgitation is normal (<35 mmHg).  · The left ventricular cavity is borderline dilated.  · Left ventricular ejection fraction appears to be 56 - 60%.  · Left ventricular diastolic function is consistent with (grade I) impaired relaxation.  · No evidence of significant valvular disease         No results found for this or any previous visit.      Cardiolite (Tc-99m Sestamibi) stress test   Lab Review:       CBC    CBC 4/14/22 6/9/22 6/10/22   WBC 5.87 6.40 7.03   RBC 4.66 4.70 4.39   Hemoglobin 13.6 13.8 12.7   Hematocrit 41.1 42.7 40.7   MCV 88.2 90.9 92.7   MCH 29.2 29.4 28.9   MCHC 33.1 32.3 31.2 (A)   RDW 12.7 12.8 12.9   Platelets 369 385 380   (A) Abnormal value              CMP    CMP 4/14/22 6/9/22 6/10/22   Glucose 115 (A) 114 (A) 138 (A)   BUN 10 15 18    Creatinine 0.72 0.72 0.73   Sodium 139 142 142   Potassium 4.1 4.3 4.0   Chloride 103 106 106   Calcium 9.9 9.5 9.6   Albumin 4.60 4.40    Total Bilirubin 0.3 0.2    Alkaline Phosphatase 42 56    AST (SGOT) 15 17    ALT (SGPT) 14 19    (A) Abnormal value       Comments are available for some flowsheets but are not being displayed.              CARDIAC LABS:      Lab 06/09/22  0957   PROTIME 12.2   INR 0.90      No results found for: DIGOXIN   Lab Results   Component Value Date    TSH 25.620 (H) 06/09/2022     Results from last 7 days   Lab Units 06/10/22  0426   CHOLESTEROL mg/dL 242*   HDL CHOL mg/dL 52     No results found for: POCTROP  No components found for: DDIMERQUAN  Lab Results   Component Value Date    MG 2.1 06/10/2022             CARDIAC LABS:      Lab 06/09/22  0957   PROTIME 12.2   INR 0.90        Imaging:  CXR     IMPRESSION: No active disease.     Assessment:    Syncope      Patient with episode of fall questionable whether or not any loss of consciousness no focal complaints this morning she does have a mildly depressed affect.  Telemetry overnight has not shown any significant dysrhythmias do not suspect any underlying cardiac etiology is a contributing cause    Plan:  1.  Continue to monitor on telemetry we will sign off please feel free to call with any further question      Thank you for allowing us to share in [facility].            Kulwinder Zuniga MD   6/10/2022    10:44 EDT

## 2022-06-10 NOTE — PLAN OF CARE
Goal Outcome Evaluation:  Plan of Care Reviewed With: patient           Outcome Evaluation: Patient has no noted deficits in functional mobility. Patient reported being lightheaded, but was still able to ambulate household distances safely. Recommend followup with home health services.

## 2022-06-10 NOTE — PLAN OF CARE
Goal Outcome Evaluation:           Progress: no change   VSS. Medicated with Tylenol for headache x2 and Zofran for nausea x1. Staples intact with no bleeding or drainage. Neurology saw patient this shift, stroke ruled out. Patient had 3 episodes of screaming and crying in her sleep, when woken she stated she was having a nightmare of a man attacking her, and that this happens frequently. MD notified for possible pysch consult, but stated to hold off for now. Otherwise, patient slept well with no complaints.

## 2022-06-11 ENCOUNTER — READMISSION MANAGEMENT (OUTPATIENT)
Dept: CALL CENTER | Facility: HOSPITAL | Age: 72
End: 2022-06-11

## 2022-06-11 LAB — QT INTERVAL: 514 MS

## 2022-06-11 NOTE — OUTREACH NOTE
Prep Survey    Flowsheet Row Responses   [facility] patient discharged from? [facility]   Is LACE score < 7 ? Yes   Emergency Room discharge w/ pulse ox? No   Eligibility [facility]   Date of Admission 06/09/22   Date of Discharge 06/10/22   Discharge Disposition Home or Self Care   Discharge diagnosis Fall with head traumaHypertensive emergency   Does the patient have one of the following disease processes/diagnoses(primary or secondary)? Other   Does the patient have Home health ordered? No   Is there a DME ordered? No   Prep survey completed? Yes          ODALYS SCHMIDT - Registered Nurse

## 2022-06-13 ENCOUNTER — TRANSITIONAL CARE MANAGEMENT TELEPHONE ENCOUNTER (OUTPATIENT)
Dept: CALL CENTER | Facility: HOSPITAL | Age: 72
End: 2022-06-13

## 2022-06-13 NOTE — OUTREACH NOTE
Call Center TCM Note    Flowsheet Row Responses   [facility] patient discharged from? [facility]   Does the patient have one of the following disease processes/diagnoses(primary or secondary)? Other   TCM attempt successful? Yes   Call start time 1403   Call end time 1411   Discharge diagnosis Fall with head traumaHypertensive emergency   Meds reviewed with patient/caregiver? Yes   Is the patient having any side effects they believe may be caused by any medication additions or changes? No   Does the patient have all medications ordered at discharge? Yes   Is the patient taking all medications as directed (includes completed medication regime)? Yes   Does the patient have a primary care provider?  Yes   Does the patient have an appointment with their PCP within 7 days of discharge? No   What is preventing the patient from scheduling follow up appointments within 7 days of discharge? Haven't had time   Nursing Interventions Advised patient to make appointment   Has the patient kept scheduled appointments due by today? N/A   Comments No available appt in the TCM time frame   Psychosocial issues? No   Did the patient receive a copy of their discharge instructions? Yes   Nursing interventions Reviewed instructions with patient   What is the patient's perception of their health status since discharge? Improving   Is the patient/caregiver able to teach back signs and symptoms related to disease process for when to call PCP? Yes   Is the patient/caregiver able to teach back signs and symptoms related to disease process for when to call 911? Yes   Is the patient/caregiver able to teach back the hierarchy of who to call/visit for symptoms/problems? PCP, Specialist, Home health nurse, Urgent Care, ED, 911 Yes   If the patient is a current smoker, are they able to teach back resources for cessation? Not a smoker   Additional teach back comments Sat states she wasn't feeling well but yesterday she started to feel better.  She  had questions regarding when does she need staples removed from her head.  Will message PCP regading this and an appt.    TCM call completed? Yes   Wrap up additional comments Will message PCP office regarding an appt and when she will need staples removed.           Helen Barahona LPN    6/13/2022, 14:12 EDT

## 2022-06-14 ENCOUNTER — HOSPITAL ENCOUNTER (OUTPATIENT)
Dept: MAMMOGRAPHY | Facility: HOSPITAL | Age: 72
Discharge: HOME | End: 2022-06-14
Admitting: NURSE PRACTITIONER

## 2022-06-14 DIAGNOSIS — Z12.31 ENCOUNTER FOR SCREENING MAMMOGRAM FOR MALIGNANT NEOPLASM OF BREAST: ICD-10-CM

## 2022-06-14 PROCEDURE — 77067 SCR MAMMO BI INCL CAD: CPT

## 2022-06-14 PROCEDURE — 77063 BREAST TOMOSYNTHESIS BI: CPT

## 2022-06-15 LAB
CREAT BLDA-MCNC: 0.7 MG/DL
EGFRCR SERPLBLD CKD-EPI 2021: 92.6 ML/MIN/1.73
GLUCOSE BLDC GLUCOMTR-MCNC: 113 MG/DL (ref 70–99)

## 2022-06-16 ENCOUNTER — OFFICE VISIT (OUTPATIENT)
Dept: INTERNAL MEDICINE | Facility: CLINIC | Age: 72
End: 2022-06-16

## 2022-06-16 VITALS
HEART RATE: 79 BPM | OXYGEN SATURATION: 94 % | RESPIRATION RATE: 20 BRPM | DIASTOLIC BLOOD PRESSURE: 90 MMHG | TEMPERATURE: 98.9 F | WEIGHT: 162 LBS | BODY MASS INDEX: 25.43 KG/M2 | SYSTOLIC BLOOD PRESSURE: 146 MMHG | HEIGHT: 67 IN

## 2022-06-16 DIAGNOSIS — F33.1 MAJOR DEPRESSIVE DISORDER, RECURRENT EPISODE, MODERATE DEGREE: ICD-10-CM

## 2022-06-16 DIAGNOSIS — R00.2 PALPITATIONS: ICD-10-CM

## 2022-06-16 DIAGNOSIS — I10 PRIMARY HYPERTENSION: ICD-10-CM

## 2022-06-16 DIAGNOSIS — F51.4 NIGHT TERROR: ICD-10-CM

## 2022-06-16 DIAGNOSIS — E03.9 HYPOTHYROIDISM, UNSPECIFIED TYPE: ICD-10-CM

## 2022-06-16 DIAGNOSIS — S09.90XA RECENT HEAD TRAUMA, INITIAL ENCOUNTER: Primary | ICD-10-CM

## 2022-06-16 PROCEDURE — 99495 TRANSJ CARE MGMT MOD F2F 14D: CPT | Performed by: PHYSICIAN ASSISTANT

## 2022-06-16 PROCEDURE — 1111F DSCHRG MED/CURRENT MED MERGE: CPT | Performed by: PHYSICIAN ASSISTANT

## 2022-06-16 PROCEDURE — 93000 ELECTROCARDIOGRAM COMPLETE: CPT | Performed by: PHYSICIAN ASSISTANT

## 2022-06-16 RX ORDER — VENLAFAXINE HYDROCHLORIDE 37.5 MG/1
37.5 CAPSULE, EXTENDED RELEASE ORAL DAILY
Qty: 90 CAPSULE | Refills: 1 | Status: SHIPPED | OUTPATIENT
Start: 2022-06-16 | End: 2022-06-16 | Stop reason: SDUPTHER

## 2022-06-16 RX ORDER — LEVOTHYROXINE SODIUM 0.15 MG/1
150 TABLET ORAL
Qty: 30 TABLET | Refills: 1 | Status: SHIPPED | OUTPATIENT
Start: 2022-06-16 | End: 2022-10-12 | Stop reason: SDUPTHER

## 2022-06-16 RX ORDER — CHOLECALCIFEROL (VITAMIN D3) 125 MCG
5 CAPSULE ORAL NIGHTLY
Qty: 30 EACH | Refills: 1 | Status: SHIPPED | OUTPATIENT
Start: 2022-06-16 | End: 2022-10-18

## 2022-06-16 RX ORDER — LISINOPRIL 5 MG/1
5 TABLET ORAL
Qty: 30 TABLET | Refills: 1 | Status: SHIPPED | OUTPATIENT
Start: 2022-06-16 | End: 2022-07-26

## 2022-06-16 RX ORDER — TRAZODONE HYDROCHLORIDE 150 MG/1
150 TABLET ORAL NIGHTLY
COMMUNITY
End: 2022-10-18

## 2022-06-16 RX ORDER — VENLAFAXINE HYDROCHLORIDE 150 MG/1
150 CAPSULE, EXTENDED RELEASE ORAL DAILY
Qty: 90 CAPSULE | Refills: 1 | Status: SHIPPED | OUTPATIENT
Start: 2022-06-16 | End: 2022-10-18 | Stop reason: SDUPTHER

## 2022-06-16 NOTE — PROGRESS NOTES
Transitional Care Follow Up Visit  Subjective     Gregoria is a 71 y.o. female who presents for a transitional care management visit.    Within 48 business hours after discharge our office contacted her via telephone to coordinate her care and needs.      I reviewed and discussed the details of that call along with the discharge summary, hospital problems, inpatient lab results, inpatient diagnostic studies, and consultation reports with Gregoria.     Current outpatient and discharge medications have been reconciled for the patient.  Reviewed by: Leta Manning PA-C      Date of TCM Phone Call 6/11/2022   [facility]   Date of Admission 6/9/2022   Date of Discharge 6/10/2022   Discharge Disposition Home or Self Care       Risk for Readmission (LACE) Score: 3 (6/10/2022  6:02 AM)      Patient seen in the office 6/9 after a fall from bed.  She was sent to the emergency room via EMS for altered mental status.  Patient was admitted for the head trauma.  Cardiac, stroke work-ups were negative.  EEG was normal.  Neurology was consulted.  Patient diagnosed with REM sleep disorder/nightmares and night terrors which have been waking her up causing her to be disoriented.  Blood pressure was elevated and patient and her blood pressure medications were titrated up to control.  Telemetry showed PVCs which was rate controlled with beta-blocker but beta-blocker had to be stopped due to bradycardia.  She has a follow-up with cardiology outpatient.  TSH was elevated, pt taking synthroid outpatient.  Synthroid was increased to 150 mcg.  Pt states she has been doing well since discharge  HA and dizziness has resolved  Nausea has resolved    Night terrors: she has been taking melatonin rx by hospital, cannot tell if this helps yet or not     Depression: doing well on Effexor.   Needs refill   Denies si/hi    BP elevation: Lisinopril was increased to 5mg in hospital   She denies chest pain  Admits to palptiations which is  "chronic for her, she has upcoming cardiology apt. She is rescheduling stress test because it was scheduled when she went to hospital       Course During Hospital Stay The following information was reviewed by: Leta Manning PA-C on 06/16/2022     The following portions of the patient's history were reviewed and updated as appropriate: allergies, current medications, past family history, past medical history, past social history, past surgical history and problem list.     Vitals:    06/16/22 1459   BP: 146/90   Pulse: 79   Resp: 20   Temp: 98.9 °F (37.2 °C)   SpO2: 94%   Weight: 73.5 kg (162 lb)   Height: 170.2 cm (67\")       Review of Systems    Objective   Physical Exam  Vitals reviewed.   Constitutional:       Appearance: Normal appearance.   HENT:      Head: Normocephalic and atraumatic.      Nose: Nose normal.      Mouth/Throat:      Mouth: Mucous membranes are moist.   Eyes:      Extraocular Movements: Extraocular movements intact.      Conjunctiva/sclera: Conjunctivae normal.      Pupils: Pupils are equal, round, and reactive to light.   Cardiovascular:      Rate and Rhythm: Normal rate and regular rhythm.   Pulmonary:      Effort: Pulmonary effort is normal.      Breath sounds: Normal breath sounds.   Abdominal:      General: Abdomen is flat. Bowel sounds are normal.      Palpations: Abdomen is soft.   Musculoskeletal:         General: Normal range of motion.   Skin:     Comments: 1 inch well healed incision on L side of scalp with 2 staples in place   Neurological:      General: No focal deficit present.      Mental Status: She is alert and oriented to person, place, and time.   Psychiatric:         Mood and Affect: Mood normal.         ECG 12 Lead    Date/Time: 6/16/2022 4:06 PM  Performed by: Leta Manning PA-C  Authorized by: Leta Manning PA-C   Comparison: compared with previous ECG from 6/9/2022  Comparison to previous ECG: Improved from previous  Rhythm: sinus rhythm  Rate: normal  BPM: " 59  Conduction: conduction normal  ST Segments: ST segments normal  T Waves: T waves normal  Other: no other findings    Clinical impression: normal ECG      Suture Removal    Date/Time: 6/16/2022 4:09 PM  Performed by: Leta Manning PA-C  Authorized by: Leta Manning PA-C   Body area: head/neck  Location details: scalp  Wound Appearance: clean  Staples Removed: 2  Patient tolerance: patient tolerated the procedure well with no immediate complications            Assessment & Plan     Diagnoses and all orders for this visit:    1. Recent head trauma, initial encounter (Primary)  Comments:  Reviewed Er note and hospital discharge. 2 staples removed without complications  Orders:  -     Cancel: Kit Staple Removal [id number]    2. Primary hypertension  Assessment & Plan:  Discussed blood pressure elevation at today's visit. Risks of blood pressure elevation include death, heart attack, stroke, kidney disease, blindness. Low salt diet, increase exercise. Discussed importance of medication compliance and not missing doses. Continue current medications at this time. We will monitor at follow up and adjust medications if still elevated. To er if chest pain, palpitations, vision loss, unilateral weakness, altered mental status. Pt understands and agrees with plan.      3. Hypothyroidism, unspecified type  Comments:  Reviewed recent labs. Cont Synthroid 150mcg, repeat labs in 6 wks and will adjust further if needed based on results  Orders:  -     TSH; Future  -     T3, Free; Future  -     T4, Free; Future    4. Palpitations  Comments:  EKG done in clinic WNL  Orders:  -     ECG 12 Lead    5. Major depressive disorder, recurrent episode, moderate degree (HCC)  Comments:  Mood stable, confirmed dose of medicine in office with pt    6. Night terror  Comments:  Discussed night terrors. Will cont melatonin at this time and montior for improvement.    Other orders  -     lisinopril (PRINIVIL,ZESTRIL) 5 MG tablet; Take 1 tablet  by mouth Daily.  Dispense: 30 tablet; Refill: 1  -     levothyroxine (SYNTHROID, LEVOTHROID) 150 MCG tablet; Take 1 tablet by mouth Every Morning.  Dispense: 30 tablet; Refill: 1  -     Discontinue: venlafaxine XR (EFFEXOR-XR) 37.5 MG 24 hr capsule; Take 1 capsule by mouth Daily.  Dispense: 90 capsule; Refill: 1  -     venlafaxine XR (EFFEXOR-XR) 150 MG 24 hr capsule; Take 1 capsule by mouth Daily.  Dispense: 90 capsule; Refill: 1  -     melatonin 5 MG tablet tablet; Take 1 tablet by mouth Every Night.  Dispense: 30 each; Refill: 1  -     Suture Removal      Follow Up     Return in about 1 month (around 7/16/2022) for ramírez Hopper

## 2022-06-16 NOTE — ASSESSMENT & PLAN NOTE
Discussed blood pressure elevation at today's visit. Risks of blood pressure elevation include death, heart attack, stroke, kidney disease, blindness. Low salt diet, increase exercise. Discussed importance of medication compliance and not missing doses. Continue current medications at this time. We will monitor at follow up and adjust medications if still elevated. To er if chest pain, palpitations, vision loss, unilateral weakness, altered mental status. Pt understands and agrees with plan.

## 2022-06-17 NOTE — PROGRESS NOTES
I have reviewed the notes, assessments, and/or procedures performed by Leta Manning PA-C, I concur with her documentation of Gregoria Claire.

## 2022-07-11 ENCOUNTER — HOSPITAL ENCOUNTER (OUTPATIENT)
Dept: NUCLEAR MEDICINE | Facility: HOSPITAL | Age: 72
Discharge: HOME | End: 2022-07-11

## 2022-07-11 DIAGNOSIS — R06.09 DOE (DYSPNEA ON EXERTION): ICD-10-CM

## 2022-07-11 LAB
BH CV IMMEDIATE POST TECH DATA BLOOD PRESSURE: NORMAL MMHG
BH CV IMMEDIATE POST TECH DATA HEART RATE: 70 BPM
BH CV IMMEDIATE POST TECH DATA OXYGEN SATS: 97 %
BH CV REST NUCLEAR ISOTOPE DOSE: 9.9 MCI
BH CV SIX MINUTE RECOVERY TECH DATA BLOOD PRESSURE: NORMAL
BH CV SIX MINUTE RECOVERY TECH DATA HEART RATE: 67 BPM
BH CV SIX MINUTE RECOVERY TECH DATA OXYGEN SATURATION: 96 %
BH CV STRESS BP STAGE 1: NORMAL
BH CV STRESS COMMENTS STAGE 1: NORMAL
BH CV STRESS DOSE REGADENOSON STAGE 1: 0.4
BH CV STRESS DURATION MIN STAGE 1: 0
BH CV STRESS DURATION SEC STAGE 1: 10
BH CV STRESS HR STAGE 1: 71
BH CV STRESS NUCLEAR ISOTOPE DOSE: 34.2 MCI
BH CV STRESS O2 STAGE 1: 94
BH CV STRESS PROTOCOL 1: NORMAL
BH CV STRESS RECOVERY BP: NORMAL MMHG
BH CV STRESS RECOVERY HR: 67 BPM
BH CV STRESS RECOVERY O2: 96 %
BH CV STRESS STAGE 1: 1
BH CV THREE MINUTE POST TECH DATA BLOOD PRESSURE: NORMAL MMHG
BH CV THREE MINUTE POST TECH DATA HEART RATE: 67 BPM
BH CV THREE MINUTE POST TECH DATA OXYGEN SATURATION: 97 %
LV EF NUC BP: 62 %
MAXIMAL PREDICTED HEART RATE: 149 BPM
PERCENT MAX PREDICTED HR: 47.65 %
STRESS BASELINE BP: NORMAL MMHG
STRESS BASELINE HR: 56 BPM
STRESS O2 SAT REST: 91 %
STRESS PERCENT HR: 56 %
STRESS POST O2 SAT PEAK: 97 %
STRESS POST PEAK BP: NORMAL MMHG
STRESS POST PEAK HR: 71 BPM
STRESS TARGET HR: 127 BPM

## 2022-07-11 PROCEDURE — 0 TECHNETIUM TETROFOSMIN KIT: Performed by: NURSE PRACTITIONER

## 2022-07-11 PROCEDURE — 78452 HT MUSCLE IMAGE SPECT MULT: CPT

## 2022-07-11 PROCEDURE — 93018 CV STRESS TEST I&R ONLY: CPT | Performed by: INTERNAL MEDICINE

## 2022-07-11 PROCEDURE — 93017 CV STRESS TEST TRACING ONLY: CPT

## 2022-07-11 PROCEDURE — 78452 HT MUSCLE IMAGE SPECT MULT: CPT | Performed by: INTERNAL MEDICINE

## 2022-07-11 PROCEDURE — 25010000002 REGADENOSON 0.4 MG/5ML SOLUTION: Performed by: NURSE PRACTITIONER

## 2022-07-11 PROCEDURE — A9502 TC99M TETROFOSMIN: HCPCS | Performed by: NURSE PRACTITIONER

## 2022-07-11 RX ADMIN — TETROFOSMIN 1 DOSE: 1.38 INJECTION, POWDER, LYOPHILIZED, FOR SOLUTION INTRAVENOUS at 12:20

## 2022-07-11 RX ADMIN — TETROFOSMIN 1 DOSE: 1.38 INJECTION, POWDER, LYOPHILIZED, FOR SOLUTION INTRAVENOUS at 11:29

## 2022-07-11 RX ADMIN — REGADENOSON 0.4 MG: 0.08 INJECTION, SOLUTION INTRAVENOUS at 12:20

## 2022-07-12 ENCOUNTER — TELEPHONE (OUTPATIENT)
Dept: CARDIOLOGY | Facility: CLINIC | Age: 72
End: 2022-07-12

## 2022-07-12 NOTE — TELEPHONE ENCOUNTER
----- Message from IAN Levy sent at 7/12/2022 10:45 AM EDT -----  Notify pt stress test result: Myocardial perfusion imaging indicates a normal myocardial perfusion study with no evidence of ischemia which means no sign of blockage in vessels of heart. Continue with follow up on 7/26

## 2022-07-14 ENCOUNTER — TELEPHONE (OUTPATIENT)
Dept: CARDIOLOGY | Facility: CLINIC | Age: 72
End: 2022-07-14

## 2022-07-14 RX ORDER — METOPROLOL SUCCINATE 25 MG/1
25 TABLET, EXTENDED RELEASE ORAL DAILY
Qty: 90 TABLET | Refills: 3 | Status: SHIPPED | OUTPATIENT
Start: 2022-07-14 | End: 2022-10-18

## 2022-07-14 NOTE — TELEPHONE ENCOUNTER
----- Message from IAN Levy sent at 7/14/2022 10:53 AM EDT -----  Notify pt holter result: Baseline normal sinus rhythm with average heart rate of 68  Maximum 136/Minimum heart rate 50  PVCs 4166 less than 1% of all beats  PACs 36,081 (7% of total beats)  11 patient triggered events: 2 were sinus bradycardia 3 were sinus bradycardia with PAC and 6 were normal sinus rhythm with PAC  SVT total of 19 episodes brief in duration longest of which 6 beats  Recommendation: Limit caffeine intake to decrease amount of PVC and PACs. These plus the SVT episodes are most likely what are causing her palpitations. She can take over the counter magnesium to help with the palpitations. Start Toprol XL 25 mg daily as well. Continue with 7/26 follow up

## 2022-07-18 ENCOUNTER — TELEPHONE (OUTPATIENT)
Dept: CARDIOLOGY | Facility: CLINIC | Age: 72
End: 2022-07-18

## 2022-07-18 NOTE — TELEPHONE ENCOUNTER
Geneva from [facility] [employer]/ [place] [phone] called to let us know pt isn't compliant with Lisinopril- last filled Mansi 10/  30 day. Also, will be due this month (25 th) for Lovastatin.

## 2022-07-18 NOTE — TELEPHONE ENCOUNTER
Okay, this can be discussed at upcoming appointment on 7/26. Recommend a family member come with the patient

## 2022-07-19 NOTE — TELEPHONE ENCOUNTER
Pt stated that she has gotten the Lisinopril since getting out of the hospital.  She stated that she doesn't have any family here but would make it to her appt.

## 2022-07-20 NOTE — PROGRESS NOTES
Chief Complaint  Hypertension    Subjective    Patient recently underwent gallbladder surgery and had to be stopped her statin medication due to elevated LFTs she has not had any recurrent syncopal episodes being discharged from the hospital heart palpitation symptoms have improved since being started on Toprol.  She gives a home blood pressure log and has noticed that her blood pressures been mildly elevated at times she had not been able to restart her lisinopril yet due to not having a prescription.    Past Medical History:   Diagnosis Date   • Breast cancer (HCC)    • Depression    • Hypertension    • Hyperthyroidism          Current Outpatient Medications:   •  acetaminophen (TYLENOL) 325 MG tablet, Take 2 tablets by mouth Every 6 (Six) Hours As Needed for Mild Pain  or Fever., Disp: 100 tablet, Rfl: 0  •  lamoTRIgine (LaMICtal) 150 MG tablet, Take 75 mg by mouth Daily. 1/2 tab qd, Disp: , Rfl:   •  levothyroxine (SYNTHROID, LEVOTHROID) 150 MCG tablet, Take 1 tablet by mouth Every Morning., Disp: 30 tablet, Rfl: 1  •  lovastatin (MEVACOR) 20 MG tablet, Take 1 tablet by mouth Daily With Dinner., Disp: 90 tablet, Rfl: 1  •  meclizine (ANTIVERT) 25 MG tablet, Take 1 tablet by mouth 3 (Three) Times a Day As Needed for Dizziness., Disp: 15 tablet, Rfl: 0  •  melatonin 5 MG tablet tablet, Take 1 tablet by mouth Every Night., Disp: 30 each, Rfl: 1  •  metoprolol succinate XL (TOPROL-XL) 25 MG 24 hr tablet, Take 1 tablet by mouth Daily., Disp: 90 tablet, Rfl: 3  •  ondansetron (Zofran) 4 MG tablet, Take 1 tablet by mouth Every 8 (Eight) Hours As Needed for Nausea or Vomiting., Disp: 12 tablet, Rfl: 0  •  oxybutynin (DITROPAN) 5 MG tablet, Take 1 tablet by mouth 2 (Two) Times a Day. May cause dry mouth, Disp: 60 tablet, Rfl: 1  •  traZODone (DESYREL) 150 MG tablet, Take 150 mg by mouth Every Night., Disp: , Rfl:   •  venlafaxine XR (EFFEXOR-XR) 150 MG 24 hr capsule, Take 1 capsule by mouth Daily., Disp: 90 capsule, Rfl:  "1  •  lisinopril (PRINIVIL,ZESTRIL) 5 MG tablet, Take 1 tablet by mouth Daily., Disp: 90 tablet, Rfl: 3    Medications Discontinued During This Encounter   Medication Reason   • lisinopril (PRINIVIL,ZESTRIL) 5 MG tablet      No Known Allergies     Social History     Tobacco Use   • Smoking status: Never Smoker   • Smokeless tobacco: Never Used   Vaping Use   • Vaping Use: Never used   Substance Use Topics   • Alcohol use: Never   • Drug use: Never       History reviewed. No pertinent family history.     Objective     /58   Pulse 66   Ht 170.2 cm (67\")   Wt 70.8 kg (156 lb)   BMI 24.43 kg/m²       Physical Exam    General Appearance:   · no acute distress  · Alert and oriented x3  HENT:   · lips not cyanotic  · Atraumatic  Neck:  · No jvd   · supple  Respiratory:  · no respiratory distress  · normal breath sounds  · no rales  Cardiovascular:  · Regular rate and rhythm  · no S3, no S4   · no murmur  · no rub  Extremities  · No cyanosis  · lower extremity edema: none    Skin:   · warm, dry  · No rashes      Result Review :     No results found for: PROBNP  CMP    CMP 4/14/22 6/9/22 6/9/22 6/10/22     0957 0957    Glucose 115 (A) 114 (A)  138 (A)   BUN 10 15  18   Creatinine 0.72 0.72 0.70 0.73   Sodium 139 142  142   Potassium 4.1 4.3  4.0   Chloride 103 106  106   Calcium 9.9 9.5  9.6   Albumin 4.60 4.40     Total Bilirubin 0.3 0.2     Alkaline Phosphatase 42 56     AST (SGOT) 15 17     ALT (SGPT) 14 19     (A) Abnormal value       Comments are available for some flowsheets but are not being displayed.           CBC w/diff    CBC w/Diff 4/14/22 6/9/22 6/10/22   WBC 5.87 6.40 7.03   RBC 4.66 4.70 4.39   Hemoglobin 13.6 13.8 12.7   Hematocrit 41.1 42.7 40.7   MCV 88.2 90.9 92.7   MCH 29.2 29.4 28.9   MCHC 33.1 32.3 31.2 (A)   RDW 12.7 12.8 12.9   Platelets 369 385 380   Neutrophil Rel % 72.9 63.5 69.1   Immature Granulocyte Rel % 0.3 0.5 0.6 (A)   Lymphocyte Rel % 14.8 (A) 20.8 17.4 (A)   Monocyte Rel % 8.9 " 8.8 8.0   Eosinophil Rel % 2.2 5.0 4.0   Basophil Rel % 0.9 1.4 0.9   (A) Abnormal value             Lab Results   Component Value Date    TSH 25.620 (H) 06/09/2022      Lab Results   Component Value Date    FREET4 1.23 07/15/2022      No results found for: DDIMERQUANT  Magnesium   Date Value Ref Range Status   07/18/2022 2.0 1.6 - 2.6 mg/dL Final      No results found for: DIGOXIN   Lab Results   Component Value Date    TROPONINT <0.010 06/10/2022           Lipid Panel    Lipid Panel 4/14/22 6/10/22   Total Cholesterol 182 242 (A)   Triglycerides 71 158 (A)   HDL Cholesterol 53 52   VLDL Cholesterol 13 29   LDL Cholesterol  116 (A) 161 (A)   LDL/HDL Ratio 2.17 3.05   (A) Abnormal value            No results found for: POCTROP    Results for orders placed during the hospital encounter of 06/09/22    Adult Transthoracic Echo Complete W/ Cont if Necessary Per Protocol (With Agitated Saline)    Interpretation Summary  · Estimated right ventricular systolic pressure from tricuspid regurgitation is normal (<35 mmHg).  · The left ventricular cavity is borderline dilated.  · Left ventricular ejection fraction appears to be 56 - 60%.  · Left ventricular diastolic function is consistent with (grade I) impaired relaxation.  · No evidence of significant valvular disease                 Diagnoses and all orders for this visit:    1. Syncope, unspecified syncope type (Primary)  Assessment & Plan:  No recurrent episodes weeklong event monitor did not show any significant tachycardia or bradycardia dysrhythmias that would explain any syncopal episodes she did have some atrial ectopy is reported improvement in those symptoms and started on beta-blocker would just continue with that dose of Toprol if recurrent episode of syncope occurs would favor an implantable loop recorder patient can follow-up on as-needed basis      2. Dyslipidemia  Assessment & Plan:  Patient has been taken off of her cholesterol medication likely due to  elevated LFTs after recent gallbladder episode given her 10-year ASCVD score and her LDL levels do favor treatment of her cholesterol in the future with normalization of her LFTs for goal of less than 100 on her LDL    Orders:  -     Lipid Panel; Future  -     Hepatic Function Panel; Future    3. Palpitations  Assessment & Plan:  These appear to be primarily PACs on her Holter monitor she had some brief ectopic runs short in duration as well as some infrequent PVCs she has had symptomatic improvement with her beta-blocker we will continue with the 25 mg dosing regiment.  Her echocardiogram revealed an otherwise structurally normal heart feel these symptoms are benign in nature and currently under control with her beta-blocker      Other orders  -     lisinopril (PRINIVIL,ZESTRIL) 5 MG tablet; Take 1 tablet by mouth Daily.  Dispense: 90 tablet; Refill: 3          Follow Up     No follow-ups on file.          Patient was given instructions and counseling regarding her condition or for health maintenance advice. Please see specific information pulled into the AVS if appropriate.

## 2022-07-26 ENCOUNTER — OFFICE VISIT (OUTPATIENT)
Dept: CARDIOLOGY | Facility: CLINIC | Age: 72
End: 2022-07-26

## 2022-07-26 VITALS
DIASTOLIC BLOOD PRESSURE: 58 MMHG | BODY MASS INDEX: 24.48 KG/M2 | HEIGHT: 67 IN | SYSTOLIC BLOOD PRESSURE: 144 MMHG | HEART RATE: 66 BPM | WEIGHT: 156 LBS

## 2022-07-26 DIAGNOSIS — E78.5 DYSLIPIDEMIA: ICD-10-CM

## 2022-07-26 DIAGNOSIS — R55 SYNCOPE, UNSPECIFIED SYNCOPE TYPE: Primary | ICD-10-CM

## 2022-07-26 DIAGNOSIS — R00.2 PALPITATIONS: ICD-10-CM

## 2022-07-26 PROCEDURE — 99214 OFFICE O/P EST MOD 30 MIN: CPT | Performed by: INTERNAL MEDICINE

## 2022-07-26 RX ORDER — LISINOPRIL 5 MG/1
5 TABLET ORAL DAILY
Qty: 90 TABLET | Refills: 3 | Status: SHIPPED | OUTPATIENT
Start: 2022-07-26

## 2022-07-26 NOTE — ASSESSMENT & PLAN NOTE
No recurrent episodes weeklong event monitor did not show any significant tachycardia or bradycardia dysrhythmias that would explain any syncopal episodes she did have some atrial ectopy is reported improvement in those symptoms and started on beta-blocker would just continue with that dose of Toprol if recurrent episode of syncope occurs would favor an implantable loop recorder patient can follow-up on as-needed basis

## 2022-07-26 NOTE — ASSESSMENT & PLAN NOTE
These appear to be primarily PACs on her Holter monitor she had some brief ectopic runs short in duration as well as some infrequent PVCs she has had symptomatic improvement with her beta-blocker we will continue with the 25 mg dosing regiment.  Her echocardiogram revealed an otherwise structurally normal heart feel these symptoms are benign in nature and currently under control with her beta-blocker

## 2022-07-26 NOTE — ASSESSMENT & PLAN NOTE
Patient has been taken off of her cholesterol medication likely due to elevated LFTs after recent gallbladder episode given her 10-year ASCVD score and her LDL levels do favor treatment of her cholesterol in the future with normalization of her LFTs for goal of less than 100 on her LDL

## 2022-10-11 ENCOUNTER — CLINICAL SUPPORT (OUTPATIENT)
Dept: INTERNAL MEDICINE | Facility: CLINIC | Age: 72
End: 2022-10-11

## 2022-10-11 ENCOUNTER — TELEPHONE (OUTPATIENT)
Dept: INTERNAL MEDICINE | Facility: CLINIC | Age: 72
End: 2022-10-11

## 2022-10-11 DIAGNOSIS — E03.9 HYPOTHYROIDISM, UNSPECIFIED TYPE: ICD-10-CM

## 2022-10-11 PROCEDURE — 36415 COLL VENOUS BLD VENIPUNCTURE: CPT | Performed by: NURSE PRACTITIONER

## 2022-10-11 PROCEDURE — 84443 ASSAY THYROID STIM HORMONE: CPT | Performed by: PHYSICIAN ASSISTANT

## 2022-10-11 PROCEDURE — 84481 FREE ASSAY (FT-3): CPT | Performed by: PHYSICIAN ASSISTANT

## 2022-10-11 PROCEDURE — 84439 ASSAY OF FREE THYROXINE: CPT | Performed by: PHYSICIAN ASSISTANT

## 2022-10-12 LAB
T3FREE SERPL-MCNC: 2.67 PG/ML (ref 2–4.4)
T4 FREE SERPL-MCNC: 1.53 NG/DL (ref 0.93–1.7)
TSH SERPL DL<=0.05 MIU/L-ACNC: 0.82 UIU/ML (ref 0.27–4.2)

## 2022-10-12 RX ORDER — LEVOTHYROXINE SODIUM 0.15 MG/1
150 TABLET ORAL
Qty: 90 TABLET | Refills: 1 | Status: SHIPPED | OUTPATIENT
Start: 2022-10-12

## 2022-10-13 ENCOUNTER — TELEPHONE (OUTPATIENT)
Dept: INTERNAL MEDICINE | Facility: CLINIC | Age: 72
End: 2022-10-13

## 2022-10-13 NOTE — TELEPHONE ENCOUNTER
----- Message from Leta Manning PA-C sent at 10/12/2022 10:54 AM EDT -----  Thyroid labs normal. Continue current dose of synthroid.

## 2022-10-18 ENCOUNTER — OFFICE VISIT (OUTPATIENT)
Dept: INTERNAL MEDICINE | Facility: CLINIC | Age: 72
End: 2022-10-18

## 2022-10-18 VITALS
BODY MASS INDEX: 25.36 KG/M2 | SYSTOLIC BLOOD PRESSURE: 122 MMHG | HEIGHT: 67 IN | TEMPERATURE: 98.8 F | DIASTOLIC BLOOD PRESSURE: 66 MMHG | WEIGHT: 161.6 LBS | OXYGEN SATURATION: 97 % | HEART RATE: 88 BPM

## 2022-10-18 DIAGNOSIS — E78.5 DYSLIPIDEMIA: ICD-10-CM

## 2022-10-18 DIAGNOSIS — F33.1 MAJOR DEPRESSIVE DISORDER, RECURRENT EPISODE, MODERATE DEGREE: ICD-10-CM

## 2022-10-18 DIAGNOSIS — Z00.00 MEDICARE ANNUAL WELLNESS VISIT, SUBSEQUENT: ICD-10-CM

## 2022-10-18 DIAGNOSIS — R55 SYNCOPE, UNSPECIFIED SYNCOPE TYPE: ICD-10-CM

## 2022-10-18 DIAGNOSIS — G47.00 INSOMNIA, UNSPECIFIED TYPE: ICD-10-CM

## 2022-10-18 DIAGNOSIS — E03.9 HYPOTHYROIDISM, UNSPECIFIED TYPE: ICD-10-CM

## 2022-10-18 DIAGNOSIS — N32.81 OVERACTIVE BLADDER: ICD-10-CM

## 2022-10-18 DIAGNOSIS — I10 PRIMARY HYPERTENSION: Primary | ICD-10-CM

## 2022-10-18 PROBLEM — R30.0 DYSURIA: Status: RESOLVED | Noted: 2022-04-14 | Resolved: 2022-10-18

## 2022-10-18 PROBLEM — N30.00 ACUTE CYSTITIS WITHOUT HEMATURIA: Status: RESOLVED | Noted: 2022-04-14 | Resolved: 2022-10-18

## 2022-10-18 LAB
BASOPHILS # BLD AUTO: 0.07 10*3/MM3 (ref 0–0.2)
BASOPHILS NFR BLD AUTO: 1.2 % (ref 0–1.5)
DEPRECATED RDW RBC AUTO: 36.7 FL (ref 37–54)
EOSINOPHIL # BLD AUTO: 0.13 10*3/MM3 (ref 0–0.4)
EOSINOPHIL NFR BLD AUTO: 2.3 % (ref 0.3–6.2)
ERYTHROCYTE [DISTWIDTH] IN BLOOD BY AUTOMATED COUNT: 12 % (ref 12.3–15.4)
HCT VFR BLD AUTO: 43.3 % (ref 34–46.6)
HGB BLD-MCNC: 14.6 G/DL (ref 12–15.9)
IMM GRANULOCYTES # BLD AUTO: 0.03 10*3/MM3 (ref 0–0.05)
IMM GRANULOCYTES NFR BLD AUTO: 0.5 % (ref 0–0.5)
LYMPHOCYTES # BLD AUTO: 1.15 10*3/MM3 (ref 0.7–3.1)
LYMPHOCYTES NFR BLD AUTO: 19.9 % (ref 19.6–45.3)
MCH RBC QN AUTO: 28.3 PG (ref 26.6–33)
MCHC RBC AUTO-ENTMCNC: 33.7 G/DL (ref 31.5–35.7)
MCV RBC AUTO: 83.9 FL (ref 79–97)
MONOCYTES # BLD AUTO: 0.5 10*3/MM3 (ref 0.1–0.9)
MONOCYTES NFR BLD AUTO: 8.7 % (ref 5–12)
NEUTROPHILS NFR BLD AUTO: 3.89 10*3/MM3 (ref 1.7–7)
NEUTROPHILS NFR BLD AUTO: 67.4 % (ref 42.7–76)
NRBC BLD AUTO-RTO: 0 /100 WBC (ref 0–0.2)
PLATELET # BLD AUTO: 386 10*3/MM3 (ref 140–450)
PMV BLD AUTO: 10.1 FL (ref 6–12)
RBC # BLD AUTO: 5.16 10*6/MM3 (ref 3.77–5.28)
WBC NRBC COR # BLD: 5.77 10*3/MM3 (ref 3.4–10.8)

## 2022-10-18 PROCEDURE — 80053 COMPREHEN METABOLIC PANEL: CPT | Performed by: NURSE PRACTITIONER

## 2022-10-18 PROCEDURE — 85025 COMPLETE CBC W/AUTO DIFF WBC: CPT | Performed by: NURSE PRACTITIONER

## 2022-10-18 PROCEDURE — 90677 PCV20 VACCINE IM: CPT | Performed by: NURSE PRACTITIONER

## 2022-10-18 PROCEDURE — 99214 OFFICE O/P EST MOD 30 MIN: CPT | Performed by: NURSE PRACTITIONER

## 2022-10-18 PROCEDURE — G0008 ADMIN INFLUENZA VIRUS VAC: HCPCS | Performed by: NURSE PRACTITIONER

## 2022-10-18 PROCEDURE — 36415 COLL VENOUS BLD VENIPUNCTURE: CPT | Performed by: NURSE PRACTITIONER

## 2022-10-18 PROCEDURE — G0009 ADMIN PNEUMOCOCCAL VACCINE: HCPCS | Performed by: NURSE PRACTITIONER

## 2022-10-18 PROCEDURE — 80061 LIPID PANEL: CPT | Performed by: NURSE PRACTITIONER

## 2022-10-18 PROCEDURE — G0439 PPPS, SUBSEQ VISIT: HCPCS | Performed by: NURSE PRACTITIONER

## 2022-10-18 PROCEDURE — 90662 IIV NO PRSV INCREASED AG IM: CPT | Performed by: NURSE PRACTITIONER

## 2022-10-18 RX ORDER — TRAZODONE HYDROCHLORIDE 50 MG/1
100 TABLET ORAL NIGHTLY
Qty: 30 TABLET | Refills: 0 | Status: SHIPPED | OUTPATIENT
Start: 2022-10-18 | End: 2022-12-08

## 2022-10-18 RX ORDER — LAMOTRIGINE 150 MG/1
75 TABLET ORAL DAILY
Qty: 45 TABLET | Refills: 1 | Status: SHIPPED | OUTPATIENT
Start: 2022-10-18 | End: 2022-12-08 | Stop reason: SDUPTHER

## 2022-10-18 RX ORDER — VENLAFAXINE HYDROCHLORIDE 150 MG/1
150 CAPSULE, EXTENDED RELEASE ORAL DAILY
Qty: 90 CAPSULE | Refills: 1 | Status: SHIPPED | OUTPATIENT
Start: 2022-10-18

## 2022-10-18 RX ORDER — OXYBUTYNIN CHLORIDE 5 MG/1
5 TABLET ORAL 4 TIMES DAILY
Qty: 120 TABLET | Refills: 1 | Status: SHIPPED | OUTPATIENT
Start: 2022-10-18

## 2022-10-18 NOTE — ASSESSMENT & PLAN NOTE
Patient is battling with some insomnia, she feels like it is secondary to her overactive bladder.  We have started medication back for that, we will go ahead and start trazodone back, will start at 50 mg once at bedtime, can increase to 100.  If not working after that, patient advised to follow-up.  We will plan to follow-up regardless in 6 months.

## 2022-10-18 NOTE — ASSESSMENT & PLAN NOTE
Continues to be an issue, patient had stopped taking the oxybutynin, will restart, advised to take it 4 times daily.  We will trial for 2 months and can continue if working well.

## 2022-10-18 NOTE — PROGRESS NOTES
The ABCs of the Annual Wellness Visit  Subsequent Medicare Wellness Visit    Chief Complaint   Patient presents with   • Medicare Wellness-subsequent   • Hypertension      Subjective    History of Present Illness:  Gregoria Claire is a 72 y.o. female who presents for a Subsequent Medicare Wellness Visit.    The following portions of the patient's history were reviewed and   updated as appropriate: allergies, current medications, past family history, past medical history, past social history, past surgical history and problem list.    Compared to one year ago, the patient feels her physical   health is better.    Compared to one year ago, the patient feels her mental   health is better.    Recent Hospitalizations:  This patient has had a [facility] admission record on file within the last 365 days.    Current Medical Providers:  Patient Care Team:  Reggie Stockton APRN as PCP - General (Internal Medicine)    Outpatient Medications Prior to Visit   Medication Sig Dispense Refill   • levothyroxine (SYNTHROID, LEVOTHROID) 150 MCG tablet Take 1 tablet by mouth Every Morning. 90 tablet 1   • lisinopril (PRINIVIL,ZESTRIL) 5 MG tablet Take 1 tablet by mouth Daily. 90 tablet 3   • lovastatin (MEVACOR) 20 MG tablet Take 1 tablet by mouth Daily With Dinner. 90 tablet 1   • lamoTRIgine (LaMICtal) 150 MG tablet Take 75 mg by mouth Daily. 1/2 tab qd     • venlafaxine XR (EFFEXOR-XR) 150 MG 24 hr capsule Take 1 capsule by mouth Daily. 90 capsule 1   • acetaminophen (TYLENOL) 325 MG tablet Take 2 tablets by mouth Every 6 (Six) Hours As Needed for Mild Pain  or Fever. 100 tablet 0   • meclizine (ANTIVERT) 25 MG tablet Take 1 tablet by mouth 3 (Three) Times a Day As Needed for Dizziness. 15 tablet 0   • melatonin 5 MG tablet tablet Take 1 tablet by mouth Every Night. 30 each 1   • metoprolol succinate XL (TOPROL-XL) 25 MG 24 hr tablet Take 1 tablet by mouth Daily. 90 tablet 3   • ondansetron (Zofran) 4 MG tablet Take 1 tablet by  "mouth Every 8 (Eight) Hours As Needed for Nausea or Vomiting. 12 tablet 0   • oxybutynin (DITROPAN) 5 MG tablet Take 1 tablet by mouth 2 (Two) Times a Day. May cause dry mouth 60 tablet 1   • traZODone (DESYREL) 150 MG tablet Take 150 mg by mouth Every Night.       No facility-administered medications prior to visit.       No opioid medication identified on active medication list. I have reviewed chart for other potential  high risk medication/s and harmful drug interactions in the elderly.          Aspirin is not on active medication list.  Aspirin use is not indicated based on review of current medical condition/s. Risk of harm outweighs potential benefits.  .    Patient Active Problem List   Diagnosis   • History of breast cancer   • Primary hypertension   • Hypothyroidism   • Palpitations   • Recent head trauma   • Syncope   • Major depressive disorder, recurrent episode, moderate degree (HCC)   • Dyslipidemia   • Overactive bladder   • Insomnia     Advance Care Planning  Advance Directive is not on file.  ACP discussion was held with the patient during this visit. Patient does not have an advance directive, information provided.          Objective    Vitals:    10/18/22 1313   BP: 122/66   BP Location: Right arm   Patient Position: Sitting   Cuff Size: Adult   Pulse: 88   Temp: 98.8 °F (37.1 °C)   SpO2: 97%   Weight: 73.3 kg (161 lb 9.6 oz)   Height: 170.2 cm (67\")     Estimated body mass index is 25.31 kg/m² as calculated from the following:    Height as of this encounter: 170.2 cm (67\").    Weight as of this encounter: 73.3 kg (161 lb 9.6 oz).    BMI is >= 25 and <30. (Overweight) The following options were offered after discussion;: exercise counseling/recommendations      Does the patient have evidence of cognitive impairment? No    Physical Exam  Vitals and nursing note reviewed.   Constitutional:       Appearance: Normal appearance.   HENT:      Head: Normocephalic and atraumatic.      Nose: Nose normal. "      Mouth/Throat:      Mouth: Mucous membranes are moist.      Pharynx: Oropharynx is clear.   Eyes:      Pupils: Pupils are equal, round, and reactive to light.   Cardiovascular:      Rate and Rhythm: Normal rate.   Pulmonary:      Effort: Pulmonary effort is normal.   Neurological:      Mental Status: She is alert.   Psychiatric:         Mood and Affect: Mood normal.         Thought Content: Thought content normal.         Judgment: Judgment normal.                 HEALTH RISK ASSESSMENT    Smoking Status:  Social History     Tobacco Use   Smoking Status Never   Smokeless Tobacco Never     Alcohol Consumption:  Social History     Substance and Sexual Activity   Alcohol Use Never     Fall Risk Screen:    STEADI Fall Risk Assessment was completed, and patient is at HIGH risk for falls. Assessment completed on:10/18/2022    Depression Screening:  PHQ-2/PHQ-9 Depression Screening 10/18/2022   Little Interest or Pleasure in Doing Things 0-->not at all   Feeling Down, Depressed or Hopeless 0-->not at all   PHQ-9: Brief Depression Severity Measure Score 0       Health Habits and Functional and Cognitive Screening:  Functional & Cognitive Status 10/18/2022   Do you have difficulty preparing food and eating? No   Do you have difficulty bathing yourself, getting dressed or grooming yourself? No   Do you have difficulty using the toilet? No   Do you have difficulty moving around from place to place? No   Do you have trouble with steps or getting out of a bed or a chair? No   Current Diet Well Balanced Diet   Dental Exam Not up to date   Eye Exam Not up to date   Exercise (times per week) 0 times per week   Do you need help using the phone?  No   Are you deaf or do you have serious difficulty hearing?  No   Do you need help with transportation? No   Do you need help shopping? No   Do you need help preparing meals?  No   Do you need help with housework?  No   Do you need help with laundry? No   Do you need help taking your  medications? No   Do you need help managing money? No   Do you ever drive or ride in a car without wearing a seat belt? Yes   Have you felt unusual stress, anger or loneliness in the last month? No   If you need help, do you have trouble finding someone available to you? No   Have you been bothered in the last four weeks by sexual problems? No   Do you have difficulty concentrating, remembering or making decisions? No       Age-appropriate Screening Schedule:  Refer to the list below for future screening recommendations based on patient's age, sex and/or medical conditions. Orders for these recommended tests are listed in the plan section. The patient has been provided with a written plan.    Health Maintenance   Topic Date Due   • TDAP/TD VACCINES (1 - Tdap) Never done   • ZOSTER VACCINE (1 of 2) Never done   • DXA SCAN  01/10/2020   • INFLUENZA VACCINE  08/01/2022   • MAMMOGRAM  06/14/2024              Assessment & Plan   CMS Preventative Services Quick Reference  Risk Factors Identified During Encounter  Cardiovascular Disease  Immunizations Discussed/Encouraged (specific Immunizations; Influenza, Prevnar 20 (Pneumococcal 20-valent conjugate), Shingrix and COVID19  Inactivity/Sedentary  The above risks/problems have been discussed with the patient.  Follow up actions/plans if indicated are seen below in the Assessment/Plan Section.  Pertinent information has been shared with the patient in the After Visit Summary.    Diagnoses and all orders for this visit:    1. Primary hypertension (Primary)  Assessment & Plan:  Currently stable, has come off metoprolol, continue current dosing.  Follow-up in 6 months.    Orders:  -     Comprehensive Metabolic Panel  -     CBC & Differential    2. Medicare annual wellness visit, subsequent  Comments:  No significant issues noted, ACP discussion held, patient will follow-up with us if needed.    3. Dyslipidemia  Assessment & Plan:  Reviewed cardiology notes.  They do believe  patient would benefit from being back on statin medication if labs support that.  We will check labs today, cardiology had planned on checking in January.  She will continue to follow-up with them.    Orders:  -     Lipid Panel    4. Major depressive disorder, recurrent episode, moderate degree (HCC)  Assessment & Plan:  Patient currently doing well, needs refill for Effexor.  Follow-up 6 months.      5. Overactive bladder  Assessment & Plan:  Continues to be an issue, patient had stopped taking the oxybutynin, will restart, advised to take it 4 times daily.  We will trial for 2 months and can continue if working well.      6. Hypothyroidism, unspecified type  Assessment & Plan:  Labs recently checked, everything is stable.  Continue current dosing.  Follow-up 6 months.      7. Syncope, unspecified syncope type  Assessment & Plan:  Patient continues to see cardiology, has follow-up after first of the year, no significant issues have been found.  Metoprolol was stopped.  Blood pressure doing well today.  Plan to follow-up in 6 months.      8. Insomnia, unspecified type  Assessment & Plan:  Patient is battling with some insomnia, she feels like it is secondary to her overactive bladder.  We have started medication back for that, we will go ahead and start trazodone back, will start at 50 mg once at bedtime, can increase to 100.  If not working after that, patient advised to follow-up.  We will plan to follow-up regardless in 6 months.      Other orders  -     oxybutynin (DITROPAN) 5 MG tablet; Take 1 tablet by mouth 4 (Four) Times a Day. May cause dry mouth  Dispense: 120 tablet; Refill: 1  -     traZODone (DESYREL) 50 MG tablet; Take 2 tablets by mouth Every Night.  Dispense: 30 tablet; Refill: 0  -     lamoTRIgine (LaMICtal) 150 MG tablet; Take 0.5 tablets by mouth Daily. 1/2 tab qd  Dispense: 45 tablet; Refill: 1  -     venlafaxine XR (EFFEXOR-XR) 150 MG 24 hr capsule; Take 1 capsule by mouth Daily.  Dispense: 90  capsule; Refill: 1  -     Fluzone High-Dose 65+yrs (1953-1603)  -     Pneumococcal Conjugate Vaccine 20-Valent All      Follow Up:   Return in about 6 months (around 4/18/2023) for Recheck.     An After Visit Summary and PPPS were made available to the patient.

## 2022-10-18 NOTE — ASSESSMENT & PLAN NOTE
Patient continues to see cardiology, has follow-up after first of the year, no significant issues have been found.  Metoprolol was stopped.  Blood pressure doing well today.  Plan to follow-up in 6 months.

## 2022-10-18 NOTE — ASSESSMENT & PLAN NOTE
Reviewed cardiology notes.  They do believe patient would benefit from being back on statin medication if labs support that.  We will check labs today, cardiology had planned on checking in January.  She will continue to follow-up with them.

## 2022-10-19 LAB
ALBUMIN SERPL-MCNC: 4.6 G/DL (ref 3.5–5.2)
ALBUMIN/GLOB SERPL: 1.7 G/DL
ALP SERPL-CCNC: 55 U/L (ref 39–117)
ALT SERPL W P-5'-P-CCNC: 13 U/L (ref 1–33)
ANION GAP SERPL CALCULATED.3IONS-SCNC: 12.1 MMOL/L (ref 5–15)
AST SERPL-CCNC: 25 U/L (ref 1–32)
BILIRUB SERPL-MCNC: 0.3 MG/DL (ref 0–1.2)
BUN SERPL-MCNC: 16 MG/DL (ref 8–23)
BUN/CREAT SERPL: 18.8 (ref 7–25)
CALCIUM SPEC-SCNC: 10.2 MG/DL (ref 8.6–10.5)
CHLORIDE SERPL-SCNC: 103 MMOL/L (ref 98–107)
CHOLEST SERPL-MCNC: 254 MG/DL (ref 0–200)
CO2 SERPL-SCNC: 23.9 MMOL/L (ref 22–29)
CREAT SERPL-MCNC: 0.85 MG/DL (ref 0.57–1)
EGFRCR SERPLBLD CKD-EPI 2021: 72.9 ML/MIN/1.73
GLOBULIN UR ELPH-MCNC: 2.7 GM/DL
GLUCOSE SERPL-MCNC: 88 MG/DL (ref 65–99)
HDLC SERPL-MCNC: 73 MG/DL (ref 40–60)
LDLC SERPL CALC-MCNC: 170 MG/DL (ref 0–100)
LDLC/HDLC SERPL: 2.29 {RATIO}
POTASSIUM SERPL-SCNC: 4.9 MMOL/L (ref 3.5–5.2)
PROT SERPL-MCNC: 7.3 G/DL (ref 6–8.5)
SODIUM SERPL-SCNC: 139 MMOL/L (ref 136–145)
TRIGL SERPL-MCNC: 69 MG/DL (ref 0–150)
VLDLC SERPL-MCNC: 11 MG/DL (ref 5–40)

## 2022-10-21 ENCOUNTER — TELEPHONE (OUTPATIENT)
Dept: INTERNAL MEDICINE | Facility: CLINIC | Age: 72
End: 2022-10-21

## 2022-10-21 NOTE — TELEPHONE ENCOUNTER
----- Message from IAN Thomson sent at 10/19/2022  7:40 AM EDT -----  Please contact patient regarding lab work from yesterday's visit.  Overall, things look very well.  Her cholesterol levels did come back elevated, these are higher than what they were 3 months ago.  I do recommend that she follow-up with Dr. Zuniga, cardiology, and discuss restarting statin medication.  Otherwise, I do not have any other recommendations or concerns at this time.

## 2022-10-26 PROCEDURE — 87086 URINE CULTURE/COLONY COUNT: CPT | Performed by: NURSE PRACTITIONER

## 2022-10-27 ENCOUNTER — TELEPHONE (OUTPATIENT)
Dept: INTERNAL MEDICINE | Facility: CLINIC | Age: 72
End: 2022-10-27

## 2022-11-11 NOTE — TELEPHONE ENCOUNTER
Spoke with patient who stated that she was seen in UC for pain and UTI symptoms. Culture negative but 5 days later she passed a kidney stone. Patient was very upset that she did not receive back after 2 weeks. Patient is okay now and feeling better.

## 2022-12-08 RX ORDER — TRAZODONE HYDROCHLORIDE 100 MG/1
100 TABLET ORAL NIGHTLY
Qty: 90 TABLET | Refills: 1 | Status: SHIPPED | OUTPATIENT
Start: 2022-12-08 | End: 2022-12-08 | Stop reason: SDUPTHER

## 2022-12-08 RX ORDER — LAMOTRIGINE 150 MG/1
75 TABLET ORAL DAILY
Qty: 45 TABLET | Refills: 1 | Status: SHIPPED | OUTPATIENT
Start: 2022-12-08

## 2022-12-08 RX ORDER — TRAZODONE HYDROCHLORIDE 100 MG/1
100 TABLET ORAL NIGHTLY
Qty: 90 TABLET | Refills: 1 | Status: SHIPPED | OUTPATIENT
Start: 2022-12-08

## 2022-12-08 RX ORDER — LAMOTRIGINE 150 MG/1
75 TABLET ORAL DAILY
Qty: 45 TABLET | Refills: 1 | Status: SHIPPED | OUTPATIENT
Start: 2022-12-08 | End: 2022-12-08 | Stop reason: SDUPTHER

## 2022-12-09 NOTE — TELEPHONE ENCOUNTER
Received VM from patient    SB patient. Patient stated she is currently in the hospital and needed to cancel her appointments for today. Patient to call once dc to rescheduled. Patient echo was completed while in hospital.    Yes

## 2023-05-24 ENCOUNTER — OFFICE VISIT (OUTPATIENT)
Dept: INTERNAL MEDICINE | Facility: CLINIC | Age: 73
End: 2023-05-24
Payer: MEDICARE

## 2023-05-24 VITALS
HEART RATE: 71 BPM | HEIGHT: 67 IN | DIASTOLIC BLOOD PRESSURE: 70 MMHG | TEMPERATURE: 97.2 F | WEIGHT: 165.2 LBS | BODY MASS INDEX: 25.93 KG/M2 | OXYGEN SATURATION: 96 % | RESPIRATION RATE: 18 BRPM | SYSTOLIC BLOOD PRESSURE: 136 MMHG

## 2023-05-24 DIAGNOSIS — E78.5 DYSLIPIDEMIA: ICD-10-CM

## 2023-05-24 DIAGNOSIS — E03.9 HYPOTHYROIDISM, UNSPECIFIED TYPE: ICD-10-CM

## 2023-05-24 DIAGNOSIS — R53.83 FATIGUE, UNSPECIFIED TYPE: ICD-10-CM

## 2023-05-24 DIAGNOSIS — I10 PRIMARY HYPERTENSION: Primary | ICD-10-CM

## 2023-05-24 DIAGNOSIS — F33.1 MAJOR DEPRESSIVE DISORDER, RECURRENT EPISODE, MODERATE DEGREE: ICD-10-CM

## 2023-05-24 DIAGNOSIS — R63.8 OTHER SYMPTOMS AND SIGNS CONCERNING FOOD AND FLUID INTAKE: ICD-10-CM

## 2023-05-24 DIAGNOSIS — N32.81 OVERACTIVE BLADDER: ICD-10-CM

## 2023-05-24 LAB
ALBUMIN SERPL-MCNC: 4.6 G/DL (ref 3.5–5.2)
ALBUMIN/GLOB SERPL: 1.9 G/DL
ALP SERPL-CCNC: 49 U/L (ref 39–117)
ALT SERPL W P-5'-P-CCNC: 19 U/L (ref 1–33)
ANION GAP SERPL CALCULATED.3IONS-SCNC: 8 MMOL/L (ref 5–15)
AST SERPL-CCNC: 25 U/L (ref 1–32)
BASOPHILS # BLD AUTO: 0.08 10*3/MM3 (ref 0–0.2)
BASOPHILS NFR BLD AUTO: 1.3 % (ref 0–1.5)
BILIRUB SERPL-MCNC: 0.3 MG/DL (ref 0–1.2)
BUN SERPL-MCNC: 10 MG/DL (ref 8–23)
BUN/CREAT SERPL: 10.2 (ref 7–25)
CALCIUM SPEC-SCNC: 9.5 MG/DL (ref 8.6–10.5)
CHLORIDE SERPL-SCNC: 106 MMOL/L (ref 98–107)
CHOLEST SERPL-MCNC: 238 MG/DL (ref 0–200)
CO2 SERPL-SCNC: 27 MMOL/L (ref 22–29)
CREAT SERPL-MCNC: 0.98 MG/DL (ref 0.57–1)
DEPRECATED RDW RBC AUTO: 39.7 FL (ref 37–54)
EGFRCR SERPLBLD CKD-EPI 2021: 61.5 ML/MIN/1.73
EOSINOPHIL # BLD AUTO: 0.15 10*3/MM3 (ref 0–0.4)
EOSINOPHIL NFR BLD AUTO: 2.4 % (ref 0.3–6.2)
ERYTHROCYTE [DISTWIDTH] IN BLOOD BY AUTOMATED COUNT: 12.5 % (ref 12.3–15.4)
FOLATE SERPL-MCNC: >20 NG/ML (ref 4.78–24.2)
GLOBULIN UR ELPH-MCNC: 2.4 GM/DL
GLUCOSE SERPL-MCNC: 75 MG/DL (ref 65–99)
HCT VFR BLD AUTO: 42.2 % (ref 34–46.6)
HDLC SERPL-MCNC: 60 MG/DL (ref 40–60)
HGB BLD-MCNC: 14.1 G/DL (ref 12–15.9)
IMM GRANULOCYTES # BLD AUTO: 0.02 10*3/MM3 (ref 0–0.05)
IMM GRANULOCYTES NFR BLD AUTO: 0.3 % (ref 0–0.5)
IRON 24H UR-MRATE: 94 MCG/DL (ref 37–145)
IRON SATN MFR SERPL: 25 % (ref 20–50)
LDLC SERPL CALC-MCNC: 145 MG/DL (ref 0–100)
LDLC/HDLC SERPL: 2.35 {RATIO}
LYMPHOCYTES # BLD AUTO: 1.19 10*3/MM3 (ref 0.7–3.1)
LYMPHOCYTES NFR BLD AUTO: 18.8 % (ref 19.6–45.3)
MCH RBC QN AUTO: 29.2 PG (ref 26.6–33)
MCHC RBC AUTO-ENTMCNC: 33.4 G/DL (ref 31.5–35.7)
MCV RBC AUTO: 87.4 FL (ref 79–97)
MONOCYTES # BLD AUTO: 0.6 10*3/MM3 (ref 0.1–0.9)
MONOCYTES NFR BLD AUTO: 9.5 % (ref 5–12)
NEUTROPHILS NFR BLD AUTO: 4.3 10*3/MM3 (ref 1.7–7)
NEUTROPHILS NFR BLD AUTO: 67.7 % (ref 42.7–76)
NRBC BLD AUTO-RTO: 0 /100 WBC (ref 0–0.2)
PLATELET # BLD AUTO: 411 10*3/MM3 (ref 140–450)
PMV BLD AUTO: 9.8 FL (ref 6–12)
POTASSIUM SERPL-SCNC: 4.5 MMOL/L (ref 3.5–5.2)
PROT SERPL-MCNC: 7 G/DL (ref 6–8.5)
RBC # BLD AUTO: 4.83 10*6/MM3 (ref 3.77–5.28)
SODIUM SERPL-SCNC: 141 MMOL/L (ref 136–145)
T3FREE SERPL-MCNC: 2.34 PG/ML (ref 2–4.4)
T4 FREE SERPL-MCNC: 1.24 NG/DL (ref 0.93–1.7)
TIBC SERPL-MCNC: 377 MCG/DL (ref 298–536)
TRANSFERRIN SERPL-MCNC: 253 MG/DL (ref 200–360)
TRIGL SERPL-MCNC: 184 MG/DL (ref 0–150)
TSH SERPL DL<=0.05 MIU/L-ACNC: 1.33 UIU/ML (ref 0.27–4.2)
VIT B12 BLD-MCNC: 684 PG/ML (ref 211–946)
VLDLC SERPL-MCNC: 33 MG/DL (ref 5–40)
WBC NRBC COR # BLD: 6.34 10*3/MM3 (ref 3.4–10.8)

## 2023-05-24 PROCEDURE — 86645 CMV ANTIBODY IGM: CPT | Performed by: NURSE PRACTITIONER

## 2023-05-24 PROCEDURE — 86664 EPSTEIN-BARR NUCLEAR ANTIGEN: CPT | Performed by: NURSE PRACTITIONER

## 2023-05-24 PROCEDURE — 80061 LIPID PANEL: CPT | Performed by: NURSE PRACTITIONER

## 2023-05-24 PROCEDURE — 84481 FREE ASSAY (FT-3): CPT | Performed by: NURSE PRACTITIONER

## 2023-05-24 PROCEDURE — 86644 CMV ANTIBODY: CPT | Performed by: NURSE PRACTITIONER

## 2023-05-24 PROCEDURE — 84443 ASSAY THYROID STIM HORMONE: CPT | Performed by: NURSE PRACTITIONER

## 2023-05-24 PROCEDURE — 84466 ASSAY OF TRANSFERRIN: CPT | Performed by: NURSE PRACTITIONER

## 2023-05-24 PROCEDURE — 84439 ASSAY OF FREE THYROXINE: CPT | Performed by: NURSE PRACTITIONER

## 2023-05-24 PROCEDURE — 82746 ASSAY OF FOLIC ACID SERUM: CPT | Performed by: NURSE PRACTITIONER

## 2023-05-24 PROCEDURE — 85025 COMPLETE CBC W/AUTO DIFF WBC: CPT | Performed by: NURSE PRACTITIONER

## 2023-05-24 PROCEDURE — 86665 EPSTEIN-BARR CAPSID VCA: CPT | Performed by: NURSE PRACTITIONER

## 2023-05-24 PROCEDURE — 82607 VITAMIN B-12: CPT | Performed by: NURSE PRACTITIONER

## 2023-05-24 PROCEDURE — 83540 ASSAY OF IRON: CPT | Performed by: NURSE PRACTITIONER

## 2023-05-24 PROCEDURE — 80053 COMPREHEN METABOLIC PANEL: CPT | Performed by: NURSE PRACTITIONER

## 2023-05-24 RX ORDER — PREDNISOLONE ACETATE 10 MG/ML
SUSPENSION/ DROPS OPHTHALMIC
COMMUNITY
Start: 2023-02-08

## 2023-05-24 RX ORDER — KETOROLAC TROMETHAMINE 5 MG/ML
SOLUTION OPHTHALMIC
COMMUNITY
Start: 2023-02-08

## 2023-05-24 RX ORDER — OXYBUTYNIN CHLORIDE 5 MG/1
5 TABLET ORAL 4 TIMES DAILY
Qty: 120 TABLET | Refills: 1 | Status: SHIPPED | OUTPATIENT
Start: 2023-05-24

## 2023-05-24 RX ORDER — OFLOXACIN 3 MG/ML
SOLUTION/ DROPS OPHTHALMIC
COMMUNITY
Start: 2023-02-08

## 2023-05-24 NOTE — PROGRESS NOTES
Chief Complaint  lab work and Fatigue (All the time. Started about 2 months ago. Maybe awake 3 hours a day.)    Subjective        Gregoria Claire presents to [facility] for regular follow-up, need for lab work, concerns about fatigue.    Patient is following up for hypertension, currently only requiring low-dose lisinopril, tolerating well, no side effects reported.  Blood pressure today normal, does not check regularly at home.  Denies any significant symptoms.    Hyperlipidemia, was previously on statin, she had an acute episode of cholecystitis, she did go through cholecystectomy, she followed up with cardiology who did recommend that she start back on her statin, however patient does not feel like she was told this.  She has not been on statin medication.  She has not had her lab work rechecked since coming off the medication.    Hypothyroidism, she is due lab work, currently on her levothyroxine 150 mcg daily.  Takes with good compliance.    Overactive bladder, on oxybutynin, takes with good compliance, however she is somewhat frustrated that she has to take it 4 times a day.    Depression, irritability, mood swings, patient is on Effexor, she has been on this medication for quite some time, before seeing me.  She was also started on Lamictal, she takes 75 mg, patient came to me on this medication, she thought she was taking 1 tablet daily, so she was taking that for quite some time, she just recently observe the bottle and noticed that it was 1/2 tablet, so she is taking less now.    She is acutely concerned about her fatigue.  Over the last 2 to 3 months she has been experiencing excessive daytime sleepiness.  She is falling asleep very easily, she plans her day around her naps.  She feels like she is only awake 3 to 5 hours daily, the rest of the time is spent sleeping.  There have been no recent changes in her lifestyle, there have been no changes in her medication regimen.  She  "denies any other significant symptoms at this time.    Objective   Vital Signs:   /70 (BP Location: Left arm, Patient Position: Sitting, Cuff Size: Adult)   Pulse 71   Temp 97.2 °F (36.2 °C)   Resp 18   Ht 170.2 cm (67.01\")   Wt 74.9 kg (165 lb 3.2 oz)   SpO2 96%   BMI 25.87 kg/m²     Physical Exam  Vitals and nursing note reviewed.   Constitutional:       Appearance: Normal appearance. She is normal weight.   HENT:      Head: Normocephalic and atraumatic.      Right Ear: External ear normal.      Left Ear: External ear normal.      Nose: Nose normal.      Mouth/Throat:      Mouth: Mucous membranes are moist.   Eyes:      Pupils: Pupils are equal, round, and reactive to light.   Cardiovascular:      Rate and Rhythm: Normal rate and regular rhythm.      Pulses: Normal pulses.   Pulmonary:      Effort: Pulmonary effort is normal.      Breath sounds: Normal breath sounds.   Skin:     Capillary Refill: Capillary refill takes less than 2 seconds.   Neurological:      General: No focal deficit present.      Mental Status: She is alert.   Psychiatric:         Mood and Affect: Mood normal.         Thought Content: Thought content normal.        Result Review :  {The following data was reviewed by IAN Thomson on 05/25/23              BMI is >= 25 and <30. (Overweight) The following options were offered after discussion;: exercise counseling/recommendations and nutrition counseling/recommendations     Diagnoses and all orders for this visit:    1. Primary hypertension (Primary)  -     CBC & Differential  -     Comprehensive Metabolic Panel    2. Dyslipidemia  -     Lipid Panel    3. Hypothyroidism, unspecified type  -     TSH  -     T4, Free  -     T3, Free    4. Overactive bladder  -     oxybutynin (DITROPAN) 5 MG tablet; Take 1 tablet by mouth 4 (Four) Times a Day. May cause dry mouth  Dispense: 120 tablet; Refill: 1    5. Major depressive disorder, recurrent episode, moderate degree    6. Fatigue, " unspecified type  -     CBC & Differential  -     Comprehensive Metabolic Panel  -     TSH  -     T4, Free  -     EBV Antibody Profile  -     CMV IgG IgM  -     Vitamin B12  -     Folate  -     Iron Profile  -     T3, Free    7. Other symptoms and signs concerning food and fluid intake  -     Iron Profile    We spent time discussing her symptoms today, we did go back and look at previous lab work, we discussed medications that could be playing a role, however she has been on these medications for quite some time, so unsure that that would be the cause of her new onset of fatigue.  Definitely need to start with some lab work, which patient agrees with.  We will follow-up with her more once we have some more information.  Refilled medications as requested today.  Continue her current regimen for her other conditions.  We will assess her lipid panel, will determine need for ongoing statin medication.  Otherwise, would recommend follow-up in 3 months.      Follow Up   Return in about 3 months (around 8/24/2023) for Recheck.  Patient was given instructions and counseling regarding her condition or for health maintenance advice. Please see specific information pulled into the AVS if appropriate.     IAN Thomson  5/25/2023  This note was electronically signed.

## 2023-05-26 LAB
CMV IGG SERPL IA-ACNC: <0.6 U/ML (ref 0–0.59)
CMV IGM SERPL IA-ACNC: <30 AU/ML (ref 0–29.9)
EBV NA IGG SER IA-ACNC: >600 U/ML (ref 0–17.9)
EBV VCA IGG SER IA-ACNC: <18 U/ML (ref 0–17.9)
EBV VCA IGM SER IA-ACNC: <36 U/ML (ref 0–35.9)
SERVICE CMNT-IMP: ABNORMAL

## 2023-05-31 ENCOUNTER — TELEPHONE (OUTPATIENT)
Dept: INTERNAL MEDICINE | Facility: CLINIC | Age: 73
End: 2023-05-31

## 2023-05-31 NOTE — TELEPHONE ENCOUNTER
Caller: Gregoria Claire    Relationship: Self    Best call back number: [phone]    What test was performed: LABS    When was the test performed: 05/24/2023    Where was the test performed: IN OFFICE    Additional notes:  PATIENT IS REQUESTING CALL WITH RESULTS FROM LABS.

## 2023-06-01 ENCOUNTER — TRANSCRIBE ORDERS (OUTPATIENT)
Dept: ADMINISTRATIVE | Facility: HOSPITAL | Age: 73
End: 2023-06-01

## 2023-06-01 DIAGNOSIS — Z12.31 SCREENING MAMMOGRAM FOR BREAST CANCER: Primary | ICD-10-CM

## 2023-06-01 RX ORDER — LEVOTHYROXINE SODIUM 0.15 MG/1
150 TABLET ORAL
Qty: 90 TABLET | Refills: 1 | Status: SHIPPED | OUTPATIENT
Start: 2023-06-01

## 2023-06-01 RX ORDER — VENLAFAXINE HYDROCHLORIDE 75 MG/1
75 CAPSULE, EXTENDED RELEASE ORAL DAILY
Qty: 90 CAPSULE | Refills: 0 | Status: SHIPPED | OUTPATIENT
Start: 2023-06-01

## 2023-06-01 RX ORDER — LISINOPRIL 5 MG/1
5 TABLET ORAL DAILY
Qty: 90 TABLET | Refills: 3 | Status: SHIPPED | OUTPATIENT
Start: 2023-06-01

## 2023-08-03 ENCOUNTER — HOSPITAL ENCOUNTER (OUTPATIENT)
Dept: MAMMOGRAPHY | Facility: HOSPITAL | Age: 73
Discharge: HOME | End: 2023-08-03
Payer: MEDICARE

## 2023-08-03 ENCOUNTER — HOSPITAL ENCOUNTER (OUTPATIENT)
Dept: ULTRASOUND IMAGING | Facility: HOSPITAL | Age: 73
Discharge: HOME | End: 2023-08-03
Payer: MEDICARE

## 2023-08-03 DIAGNOSIS — R92.8 OTHER ABNORMAL AND INCONCLUSIVE FINDINGS ON DIAGNOSTIC IMAGING OF BREAST: ICD-10-CM

## 2023-08-03 DIAGNOSIS — N63.0 MASS OF BREAST, UNSPECIFIED LATERALITY: ICD-10-CM

## 2023-08-03 DIAGNOSIS — N63.10 MASS OF RIGHT BREAST, UNSPECIFIED QUADRANT: ICD-10-CM

## 2023-08-03 DIAGNOSIS — N63.11 UNSPECIFIED LUMP IN THE RIGHT BREAST, UPPER OUTER QUADRANT: ICD-10-CM

## 2023-08-03 PROCEDURE — G0279 TOMOSYNTHESIS, MAMMO: HCPCS

## 2023-08-03 PROCEDURE — 76642 ULTRASOUND BREAST LIMITED: CPT

## 2023-08-03 PROCEDURE — 77066 DX MAMMO INCL CAD BI: CPT

## 2023-08-16 DIAGNOSIS — N32.81 OVERACTIVE BLADDER: ICD-10-CM

## 2023-08-16 NOTE — TELEPHONE ENCOUNTER
Caller: Gregoria Claire    Relationship: Self    Best call back number: [phone]     Requested Prescriptions:   Requested Prescriptions     Pending Prescriptions Disp Refills    oxybutynin (DITROPAN) 5 MG tablet 120 tablet 1     Sig: Take 1 tablet by mouth 4 (Four) Times a Day. May cause dry mouth        Pharmacy where request should be sent: [facility] - [place], [place] - [address] [facility] - [phone]  - [phone]      Last office visit with prescribing clinician: 5/24/2023   Last telemedicine visit with prescribing clinician: Visit date not found   Next office visit with prescribing clinician: Visit date not found       Does the patient have less than a 3 day supply:  [x] Yes  [] No    Would you like a call back once the refill request has been completed: [x] Yes [] No    If the office needs to give you a call back, can they leave a voicemail: [x] Yes [] No    Lino Angulo Rep   08/16/23 12:31 EDT

## 2023-08-17 RX ORDER — OXYBUTYNIN CHLORIDE 5 MG/1
5 TABLET ORAL 4 TIMES DAILY
Qty: 120 TABLET | Refills: 1 | Status: SHIPPED | OUTPATIENT
Start: 2023-08-17

## 2023-08-17 RX ORDER — LEVOTHYROXINE SODIUM 0.15 MG/1
TABLET ORAL
Qty: 180 TABLET | Refills: 0 | Status: SHIPPED | OUTPATIENT
Start: 2023-08-17

## 2023-09-13 RX ORDER — VENLAFAXINE HYDROCHLORIDE 75 MG/1
75 CAPSULE, EXTENDED RELEASE ORAL DAILY
Qty: 90 CAPSULE | Refills: 0 | Status: SHIPPED | OUTPATIENT
Start: 2023-09-13

## 2023-09-13 NOTE — TELEPHONE ENCOUNTER
Caller: Gregoria Claire    Relationship: Self    Best call back number: [phone]     Requested Prescriptions:   Requested Prescriptions     Pending Prescriptions Disp Refills    venlafaxine XR (EFFEXOR-XR) 75 MG 24 hr capsule 90 capsule 0     Sig: Take 1 capsule by mouth Daily.        Pharmacy where request should be sent: [facility] - [place], [place] - [address] [facility] - [phone]  - [phone] FX     Last office visit with prescribing clinician: 5/24/2023   Last telemedicine visit with prescribing clinician: Visit date not found   Next office visit with prescribing clinician: 10/3/2023     Does the patient have less than a 3 day supply:  [] Yes  [x] No    Would you like a call back once the refill request has been completed: [] Yes [x] No    If the office needs to give you a call back, can they leave a voicemail: [] Yes [x] No    Lino Blair Rep   09/13/23 12:37 EDT

## 2023-10-03 ENCOUNTER — OFFICE VISIT (OUTPATIENT)
Dept: INTERNAL MEDICINE | Facility: CLINIC | Age: 73
End: 2023-10-03
Payer: MEDICARE

## 2023-10-03 VITALS
SYSTOLIC BLOOD PRESSURE: 130 MMHG | RESPIRATION RATE: 16 BRPM | OXYGEN SATURATION: 94 % | HEART RATE: 65 BPM | TEMPERATURE: 97.2 F | DIASTOLIC BLOOD PRESSURE: 80 MMHG | HEIGHT: 67 IN | BODY MASS INDEX: 27.28 KG/M2 | WEIGHT: 173.8 LBS

## 2023-10-03 DIAGNOSIS — E66.3 OVERWEIGHT WITH BODY MASS INDEX (BMI) OF 25 TO 25.9 IN ADULT: ICD-10-CM

## 2023-10-03 DIAGNOSIS — F33.1 MAJOR DEPRESSIVE DISORDER, RECURRENT EPISODE, MODERATE DEGREE: ICD-10-CM

## 2023-10-03 DIAGNOSIS — E78.5 DYSLIPIDEMIA: Primary | ICD-10-CM

## 2023-10-03 DIAGNOSIS — N32.81 OVERACTIVE BLADDER: ICD-10-CM

## 2023-10-03 DIAGNOSIS — E03.9 HYPOTHYROIDISM, UNSPECIFIED TYPE: ICD-10-CM

## 2023-10-03 PROCEDURE — 3075F SYST BP GE 130 - 139MM HG: CPT | Performed by: NURSE PRACTITIONER

## 2023-10-03 PROCEDURE — 99214 OFFICE O/P EST MOD 30 MIN: CPT | Performed by: NURSE PRACTITIONER

## 2023-10-03 PROCEDURE — 3079F DIAST BP 80-89 MM HG: CPT | Performed by: NURSE PRACTITIONER

## 2023-10-03 RX ORDER — VENLAFAXINE HYDROCHLORIDE 37.5 MG/1
37.5 CAPSULE, EXTENDED RELEASE ORAL DAILY
Qty: 7 CAPSULE | Refills: 0 | Status: SHIPPED | OUTPATIENT
Start: 2023-10-03

## 2023-10-03 RX ORDER — ATORVASTATIN CALCIUM 10 MG/1
10 TABLET, FILM COATED ORAL DAILY
Qty: 90 TABLET | Refills: 0 | Status: SHIPPED | OUTPATIENT
Start: 2023-10-03

## 2023-10-03 NOTE — PROGRESS NOTES
"Chief Complaint  Follow-up (4.5 month follow up,/Follow up on lab work )    Subjective        Gregoria Claire presents to [facility] for follow-up.    Depression, low motivation, lack of energy, has been on Effexor 75 mg, was previously at 150, we came down on her dose due to lack of energy.  Her mood remained the same, but energy had not returned.  We then advised her to come down off of her Lamictal, which she has noticed more alertness throughout the day, not sleeping as much, and her mood remains stable.  Still not having a lot of motivation, wanted to discuss switching her Effexor.    Hypothyroidism, she is on levothyroxine, takes with good compliance.  Last labs reviewed today.    Patient also wanted to go over lab work that was previously done.    Objective   Vital Signs:   /80 (BP Location: Right arm, Patient Position: Sitting, Cuff Size: Adult)   Pulse 65   Temp 97.2 °F (36.2 °C) (Temporal)   Resp 16   Ht 170.2 cm (67.01\")   Wt 78.8 kg (173 lb 12.8 oz)   SpO2 94%   BMI 27.21 kg/m²     Physical Exam  Vitals and nursing note reviewed.   Constitutional:       Appearance: Normal appearance.   HENT:      Head: Normocephalic and atraumatic.      Right Ear: External ear normal.      Left Ear: External ear normal.      Nose: Nose normal.      Mouth/Throat:      Mouth: Mucous membranes are moist.   Eyes:      Pupils: Pupils are equal, round, and reactive to light.   Cardiovascular:      Rate and Rhythm: Normal rate.   Pulmonary:      Effort: Pulmonary effort is normal.   Neurological:      Mental Status: She is alert.   Psychiatric:         Mood and Affect: Mood normal.         Thought Content: Thought content normal.      Result Review :  {The following data was reviewed by IAN Thomson on 10/03/23                Diagnoses and all orders for this visit:    1. Dyslipidemia (Primary)    2. Overactive bladder    3. Hypothyroidism, unspecified type    4. Major depressive " disorder, recurrent episode, moderate degree    5. Overweight with body mass index (BMI) of 25 to 25.9 in adult  Assessment & Plan:  Patient's (Body mass index is 27.21 kg/m².) indicates that they are overweight with health conditions that include none . Weight is newly identified. BMI is is above average; BMI management plan is completed. We discussed portion control and increasing exercise.       Other orders  -     venlafaxine XR (Effexor XR) 37.5 MG 24 hr capsule; Take 1 capsule by mouth Daily.  Dispense: 7 capsule; Refill: 0  -     Vortioxetine HBr (Trintellix) 5 MG tablet tablet; Take 1 tablet by mouth Daily With Breakfast.  Dispense: 30 tablet; Refill: 0  -     atorvastatin (Lipitor) 10 MG tablet; Take 1 tablet by mouth Daily.  Dispense: 90 tablet; Refill: 0    Patient's cholesterol levels have not been trending down, she was stopped on her medication due to gallbladder disease in 2022, she had peripheral arterial disease screening completed, showing moderate in the left, we discussed starting back on her statin, atorvastatin sent.  Discussed risk, benefits, side effects.    Patient wants to stop the oxybutynin, was not working at all, defers any further treatment at this time.  Can follow-up as needed.    Hypothyroidism, not quite due for lab work, but taking with good compliance.    Reviewed previous lab work, no major concerns.    Depression, we will switch patient to Trintellix, she has failed multiple SSRIs, the latest was her Effexor, she will wean off of this when she starts on Trintellix.  She has already stopped her Lamictal.  We will follow-up with her in 4 weeks.      Follow Up   Return in about 4 weeks (around 10/31/2023) for Recheck.  Patient was given instructions and counseling regarding her condition or for health maintenance advice. Please see specific information pulled into the AVS if appropriate.     IAN Thomson  10/3/2023  This note was electronically signed.

## 2023-10-03 NOTE — ASSESSMENT & PLAN NOTE
Patient's (Body mass index is 27.21 kg/m².) indicates that they are overweight with health conditions that include none . Weight is newly identified. BMI is is above average; BMI management plan is completed. We discussed portion control and increasing exercise.

## 2023-11-06 ENCOUNTER — OFFICE VISIT (OUTPATIENT)
Dept: INTERNAL MEDICINE | Facility: CLINIC | Age: 73
End: 2023-11-06
Payer: MEDICARE

## 2023-11-06 VITALS
WEIGHT: 176.2 LBS | RESPIRATION RATE: 16 BRPM | TEMPERATURE: 98.3 F | DIASTOLIC BLOOD PRESSURE: 80 MMHG | OXYGEN SATURATION: 98 % | HEART RATE: 74 BPM | BODY MASS INDEX: 27.65 KG/M2 | HEIGHT: 67 IN | SYSTOLIC BLOOD PRESSURE: 126 MMHG

## 2023-11-06 DIAGNOSIS — Z00.00 MEDICARE ANNUAL WELLNESS VISIT, SUBSEQUENT: Primary | ICD-10-CM

## 2023-11-06 DIAGNOSIS — F33.1 MAJOR DEPRESSIVE DISORDER, RECURRENT EPISODE, MODERATE DEGREE: ICD-10-CM

## 2023-11-06 DIAGNOSIS — G47.19 EXCESSIVE DAYTIME SLEEPINESS: ICD-10-CM

## 2023-11-06 DIAGNOSIS — E03.9 HYPOTHYROIDISM, UNSPECIFIED TYPE: ICD-10-CM

## 2023-11-06 DIAGNOSIS — I10 PRIMARY HYPERTENSION: ICD-10-CM

## 2023-11-06 DIAGNOSIS — E78.5 DYSLIPIDEMIA: ICD-10-CM

## 2023-11-06 LAB
T3FREE SERPL-MCNC: 2.95 PG/ML (ref 2–4.4)
T4 FREE SERPL-MCNC: 1.29 NG/DL (ref 0.93–1.7)
TSH SERPL DL<=0.05 MIU/L-ACNC: 0.74 UIU/ML (ref 0.27–4.2)

## 2023-11-06 PROCEDURE — 84481 FREE ASSAY (FT-3): CPT | Performed by: NURSE PRACTITIONER

## 2023-11-06 PROCEDURE — 84439 ASSAY OF FREE THYROXINE: CPT | Performed by: NURSE PRACTITIONER

## 2023-11-06 PROCEDURE — 84443 ASSAY THYROID STIM HORMONE: CPT | Performed by: NURSE PRACTITIONER

## 2023-11-06 NOTE — ASSESSMENT & PLAN NOTE
Ready for repeat lab work, has been 6 months.  No recent dosage adjustments.  Other than excessive sleepiness, no other symptoms reported.

## 2023-11-06 NOTE — ASSESSMENT & PLAN NOTE
Referral to sleep medicine to further evaluate.  Possibly secondary to depression, which we are actively treating.

## 2023-11-06 NOTE — ASSESSMENT & PLAN NOTE
Worsening, switch to Trintellix, on low-dose, increase to 10 mg daily.  Can call in 2 weeks for increased dose if still needing symptom management.  Otherwise, follow-up 4 weeks.

## 2023-11-06 NOTE — PROGRESS NOTES
The ABCs of the Annual Wellness Visit  Subsequent Medicare Wellness Visit    Subjective    Gregoria Claire is a 73 y.o. female who presents for a Subsequent Medicare Wellness Visit.    The following portions of the patient's history were reviewed and   updated as appropriate: allergies, current medications, past family history, past medical history, past social history, past surgical history, and problem list.    Compared to one year ago, the patient feels her physical   health is the same.    Compared to one year ago, the patient feels her mental   health is worse.    Recent Hospitalizations:  She was not admitted to the hospital during the last year.       Current Medical Providers:  Patient Care Team:  Reggie Stockton APRN as PCP - General (Internal Medicine)    Outpatient Medications Prior to Visit   Medication Sig Dispense Refill    atorvastatin (Lipitor) 10 MG tablet Take 1 tablet by mouth Daily. 90 tablet 0    levothyroxine (SYNTHROID, LEVOTHROID) 150 MCG tablet TAKE ONE TABLET BY MOUTH EVERY MORNING 180 tablet 0    Vortioxetine HBr (Trintellix) 5 MG tablet tablet Take 1 tablet by mouth Daily With Breakfast. 30 tablet 0    lamoTRIgine (LaMICtal) 150 MG tablet Take 0.5 tablets by mouth Daily. 1/2 tab qd (Patient not taking: Reported on 11/6/2023) 45 tablet 1    venlafaxine XR (Effexor XR) 37.5 MG 24 hr capsule Take 1 capsule by mouth Daily. (Patient not taking: Reported on 11/6/2023) 7 capsule 0     No facility-administered medications prior to visit.       No opioid medication identified on active medication list. I have reviewed chart for other potential  high risk medication/s and harmful drug interactions in the elderly.        Aspirin is not on active medication list.  Aspirin use is not indicated based on review of current medical condition/s. Risk of harm outweighs potential benefits.  .    Patient Active Problem List   Diagnosis    History of breast cancer    Primary hypertension    Hypothyroidism     "Palpitations    Recent head trauma    Syncope    Major depressive disorder, recurrent episode, moderate degree    Dyslipidemia    Overactive bladder    Insomnia    Overweight with body mass index (BMI) of 25 to 25.9 in adult    Medicare annual wellness visit, subsequent    Excessive daytime sleepiness     Advance Care Planning   Advance Care Planning     Advance Directive is not on file.  ACP discussion was held with the patient during this visit. Patient does not have an advance directive, information provided.     Objective    Vitals:    23 1325   BP: 126/80   BP Location: Left arm   Patient Position: Sitting   Cuff Size: Adult   Pulse: 74   Resp: 16   Temp: 98.3 °F (36.8 °C)   TempSrc: Temporal   SpO2: 98%   Weight: 79.9 kg (176 lb 3.2 oz)   Height: 170.2 cm (67.01\")     Estimated body mass index is 27.59 kg/m² as calculated from the following:    Height as of this encounter: 170.2 cm (67.01\").    Weight as of this encounter: 79.9 kg (176 lb 3.2 oz).           Does the patient have evidence of cognitive impairment? No          HEALTH RISK ASSESSMENT    Smoking Status:  Social History     Tobacco Use   Smoking Status Never   Smokeless Tobacco Never     Alcohol Consumption:  Social History     Substance and Sexual Activity   Alcohol Use Never     Fall Risk Screen:    STEADI Fall Risk Assessment was completed, and patient is at LOW risk for falls.Assessment completed on:2023    Depression Screenin/24/2023     2:56 PM   PHQ-2/PHQ-9 Depression Screening   Little Interest or Pleasure in Doing Things 3-->nearly every day   Feeling Down, Depressed or Hopeless 3-->nearly every day   Trouble Falling or Staying Asleep, or Sleeping Too Much 3-->nearly every day   Feeling Tired or Having Little Energy 3-->nearly every day   Poor Appetite or Overeating 3-->nearly every day   Feeling Bad about Yourself - or that You are a Failure or Have Let Yourself or Your Family Down 3-->nearly every day   Trouble " Concentrating on Things, Such as Reading the Newspaper or Watching Television 0-->not at all   Moving or Speaking So Slowly that Other People Could Have Noticed? Or the Opposite - Being So Fidgety 0-->not at all   Thoughts that You Would be Better Off Dead or of Hurting Yourself in Some Way 0-->not at all   PHQ-9: Brief Depression Severity Measure Score 18   If You Checked Off Any Problems, How Difficult Have These Problems Made It For You to Do Your Work, Take Care of Things at Home, or Get Along with Other People? very difficult       Health Habits and Functional and Cognitive Screening:      10/18/2022     1:00 PM   Functional & Cognitive Status   Do you have difficulty preparing food and eating? No   Do you have difficulty bathing yourself, getting dressed or grooming yourself? No   Do you have difficulty using the toilet? No   Do you have difficulty moving around from place to place? No   Do you have trouble with steps or getting out of a bed or a chair? No   Current Diet Well Balanced Diet   Dental Exam Not up to date   Eye Exam Not up to date   Exercise (times per week) 0 times per week   Do you need help using the phone?  No   Are you deaf or do you have serious difficulty hearing?  No   Do you need help to go to places out of walking distance? No   Do you need help shopping? No   Do you need help preparing meals?  No   Do you need help with housework?  No   Do you need help with laundry? No   Do you need help taking your medications? No   Do you need help managing money? No   Do you ever drive or ride in a car without wearing a seat belt? Yes   Have you felt unusual stress, anger or loneliness in the last month? No   If you need help, do you have trouble finding someone available to you? No   Have you been bothered in the last four weeks by sexual problems? No   Do you have difficulty concentrating, remembering or making decisions? No       Age-appropriate Screening Schedule:  Refer to the list below for  "future screening recommendations based on patient's age, sex and/or medical conditions. Orders for these recommended tests are listed in the plan section. The patient has been provided with a written plan.    Health Maintenance   Topic Date Due    TDAP/TD VACCINES (1 - Tdap) Never done    ZOSTER VACCINE (1 of 2) Never done    DXA SCAN  01/10/2020    COLORECTAL CANCER SCREENING  07/06/2023    COVID-19 Vaccine (4 - 2023-24 season) 09/01/2023    BMI FOLLOWUP  10/03/2024    ANNUAL WELLNESS VISIT  11/06/2024    MAMMOGRAM  08/03/2025    HEPATITIS C SCREENING  Completed    INFLUENZA VACCINE  Completed    Pneumococcal Vaccine 65+  Completed                  CMS Preventative Services Quick Reference  Risk Factors Identified During Encounter  Depression/Dysphoria: Prescribed new antidepressant medication treatment. Follow up visit planned.  The above risks/problems have been discussed with the patient.  Pertinent information has been shared with the patient in the After Visit Summary.  An After Visit Summary and PPPS were made available to the patient.    Follow Up:   Next Medicare Wellness visit to be scheduled in 1 year.       Additional E&M Note during same encounter follows:  Patient has multiple medical problems which are significant and separately identifiable that require additional work above and beyond the Medicare Wellness Visit.      Chief Complaint  Follow-up (1 month follow up on medication and blood work ) and Annual Exam (AMW)    Subjective          Gregoria Claire is also being seen today for follow-up.    Depression, switch to Trintellix, still not doing very well.  Sleeping a lot.    Thyroid, due for recheck.  No recent dosage adjustments.      Objective   Vital Signs:  /80 (BP Location: Left arm, Patient Position: Sitting, Cuff Size: Adult)   Pulse 74   Temp 98.3 °F (36.8 °C) (Temporal)   Resp 16   Ht 170.2 cm (67.01\")   Wt 79.9 kg (176 lb 3.2 oz)   SpO2 98%   BMI 27.59 kg/m²     Physical " Exam  Vitals and nursing note reviewed.   Constitutional:       Appearance: Normal appearance.   HENT:      Head: Normocephalic and atraumatic.      Right Ear: External ear normal.      Left Ear: External ear normal.   Cardiovascular:      Rate and Rhythm: Normal rate and regular rhythm.   Pulmonary:      Effort: Pulmonary effort is normal.      Breath sounds: Normal breath sounds.   Neurological:      Mental Status: She is alert.   Psychiatric:         Mood and Affect: Mood normal.         Thought Content: Thought content normal.                         Assessment and Plan   Diagnoses and all orders for this visit:    1. Medicare annual wellness visit, subsequent (Primary)  Assessment & Plan:  Screening labs reviewed/ordered  Counseling provided regarding age appropriate screenings and immunizations, healthy diet and exercise.         2. Primary hypertension  Assessment & Plan:  Hypertension is improving with lifestyle modifications.  Continue current treatment regimen.  Blood pressure will be reassessed at the next regular appointment.      3. Hypothyroidism, unspecified type  Assessment & Plan:  Ready for repeat lab work, has been 6 months.  No recent dosage adjustments.  Other than excessive sleepiness, no other symptoms reported.    Orders:  -     TSH  -     T3, Free  -     T4, Free    4. Major depressive disorder, recurrent episode, moderate degree  Assessment & Plan:  Worsening, switch to Trintellix, on low-dose, increase to 10 mg daily.  Can call in 2 weeks for increased dose if still needing symptom management.  Otherwise, follow-up 4 weeks.      5. Excessive daytime sleepiness  Assessment & Plan:  Referral to sleep medicine to further evaluate.  Possibly secondary to depression, which we are actively treating.    Orders:  -     Ambulatory Referral to Sleep Medicine    6. Dyslipidemia  Assessment & Plan:  On statin, not quite due for recheck, continue with diet and exercise.  Tolerating medication  well      Other orders  -     Vortioxetine HBr (Trintellix) 10 MG tablet tablet; Take 1 tablet by mouth Daily With Breakfast.  Dispense: 30 tablet; Refill: 0  -     Fluzone High-Dose 65+yrs ([id number])             Follow Up   Return in about 4 weeks (around 12/4/2023) for Recheck.  Patient was given instructions and counseling regarding her condition or for health maintenance advice. Please see specific information pulled into the AVS if appropriate.

## 2023-11-06 NOTE — ASSESSMENT & PLAN NOTE
On statin, not quite due for recheck, continue with diet and exercise.  Tolerating medication well

## 2023-11-07 RX ORDER — LEVOTHYROXINE SODIUM 0.15 MG/1
150 TABLET ORAL EVERY MORNING
Qty: 180 TABLET | Refills: 0 | Status: SHIPPED | OUTPATIENT
Start: 2023-11-07

## 2023-11-27 ENCOUNTER — TELEPHONE (OUTPATIENT)
Dept: INTERNAL MEDICINE | Facility: CLINIC | Age: 73
End: 2023-11-27
Payer: MEDICARE

## 2023-11-27 RX ORDER — VORTIOXETINE 20 MG/1
20 TABLET, FILM COATED ORAL
Qty: 30 TABLET | Refills: 0 | Status: SHIPPED | OUTPATIENT
Start: 2023-11-27 | End: 2023-11-27 | Stop reason: SDUPTHER

## 2023-11-27 RX ORDER — VORTIOXETINE 20 MG/1
20 TABLET, FILM COATED ORAL
Qty: 30 TABLET | Refills: 0 | Status: SHIPPED | OUTPATIENT
Start: 2023-11-27

## 2023-11-27 NOTE — TELEPHONE ENCOUNTER
Caller: Dakotah Gregoria    Relationship: Self    Best call back number: [phone]     Who are you requesting to speak with (clinical staff, provider,  specific staff member): MEDICAL STAFF    What was the call regarding: PATIENT HAD THE TRINTELLIX MEDICATION DOSAGE INCREASED ABOUT THREE WEEKS AGO. SHE WAS TOLD TO CALL BACK AFTER TWO WEEKS TO LET THE PROVIDER KNOW IF THE DOSAGE NEEDED TO BE INCREASED AGAIN. SHE STATED THAT THE DOSAGE NEEDS TO BE INCREASED.    [facility] - [place], [place] - [address] [facility] - [phone]  - [phone] FX

## 2023-11-27 NOTE — TELEPHONE ENCOUNTER
Called pt and let her know to take 2 of the 10mg until those were finished. Pt also requested for medication to be sent to [facility] and not [facility]. So a new script was sent to [facility]. Also instructed pt to follow up in 4 weeks per provider recommendations to see how the medication adjustment was working.

## 2023-12-04 ENCOUNTER — OFFICE VISIT (OUTPATIENT)
Dept: SLEEP MEDICINE | Facility: HOSPITAL | Age: 73
End: 2023-12-04
Payer: MEDICARE

## 2023-12-04 VITALS
BODY MASS INDEX: 27.31 KG/M2 | HEART RATE: 65 BPM | DIASTOLIC BLOOD PRESSURE: 63 MMHG | OXYGEN SATURATION: 94 % | SYSTOLIC BLOOD PRESSURE: 137 MMHG | WEIGHT: 174 LBS | HEIGHT: 67 IN

## 2023-12-04 DIAGNOSIS — Z72.821 INADEQUATE SLEEP HYGIENE: ICD-10-CM

## 2023-12-04 DIAGNOSIS — F32.A DEPRESSION, UNSPECIFIED DEPRESSION TYPE: ICD-10-CM

## 2023-12-04 DIAGNOSIS — G47.19 EXCESSIVE DAYTIME SLEEPINESS: ICD-10-CM

## 2023-12-04 DIAGNOSIS — E66.3 OVERWEIGHT: ICD-10-CM

## 2023-12-04 DIAGNOSIS — I10 ESSENTIAL HYPERTENSION: ICD-10-CM

## 2023-12-04 DIAGNOSIS — R29.818 SUSPECTED SLEEP APNEA: Primary | ICD-10-CM

## 2023-12-04 DIAGNOSIS — R06.83 SNORING: ICD-10-CM

## 2023-12-04 PROCEDURE — G0463 HOSPITAL OUTPT CLINIC VISIT: HCPCS

## 2024-01-05 ENCOUNTER — HOSPITAL ENCOUNTER (OUTPATIENT)
Dept: SLEEP MEDICINE | Facility: HOSPITAL | Age: 74
Discharge: HOME | End: 2024-01-05
Admitting: FAMILY MEDICINE
Payer: MEDICARE

## 2024-01-05 DIAGNOSIS — R29.818 SUSPECTED SLEEP APNEA: ICD-10-CM

## 2024-01-05 DIAGNOSIS — R06.83 SNORING: ICD-10-CM

## 2024-01-05 DIAGNOSIS — G47.19 EXCESSIVE DAYTIME SLEEPINESS: ICD-10-CM

## 2024-01-05 PROCEDURE — 95806 SLEEP STUDY UNATT&RESP EFFT: CPT

## 2024-01-08 RX ORDER — VORTIOXETINE 20 MG/1
20 TABLET, FILM COATED ORAL
Qty: 30 TABLET | Refills: 0 | Status: SHIPPED | OUTPATIENT
Start: 2024-01-08

## 2024-01-11 DIAGNOSIS — G47.33 OBSTRUCTIVE SLEEP APNEA, ADULT: Primary | ICD-10-CM

## 2024-01-16 ENCOUNTER — TELEPHONE (OUTPATIENT)
Dept: SLEEP MEDICINE | Facility: HOSPITAL | Age: 74
End: 2024-01-16
Payer: MEDICARE

## 2024-01-30 ENCOUNTER — OFFICE VISIT (OUTPATIENT)
Dept: INTERNAL MEDICINE | Facility: CLINIC | Age: 74
End: 2024-01-30
Payer: MEDICARE

## 2024-01-30 VITALS
HEART RATE: 67 BPM | RESPIRATION RATE: 14 BRPM | OXYGEN SATURATION: 97 % | DIASTOLIC BLOOD PRESSURE: 80 MMHG | WEIGHT: 176.4 LBS | SYSTOLIC BLOOD PRESSURE: 144 MMHG | HEIGHT: 67 IN | TEMPERATURE: 95.7 F | BODY MASS INDEX: 27.69 KG/M2

## 2024-01-30 DIAGNOSIS — E78.5 DYSLIPIDEMIA: ICD-10-CM

## 2024-01-30 DIAGNOSIS — F33.1 MAJOR DEPRESSIVE DISORDER, RECURRENT EPISODE, MODERATE DEGREE: Primary | ICD-10-CM

## 2024-01-30 LAB
CHOLEST SERPL-MCNC: 195 MG/DL (ref 0–200)
HDLC SERPL-MCNC: 55 MG/DL (ref 40–60)
LDLC SERPL CALC-MCNC: 110 MG/DL (ref 0–100)
LDLC/HDLC SERPL: 1.91 {RATIO}
TRIGL SERPL-MCNC: 175 MG/DL (ref 0–150)
VLDLC SERPL-MCNC: 30 MG/DL (ref 5–40)

## 2024-01-30 PROCEDURE — 80061 LIPID PANEL: CPT | Performed by: NURSE PRACTITIONER

## 2024-01-30 RX ORDER — VORTIOXETINE 20 MG/1
20 TABLET, FILM COATED ORAL
Qty: 30 TABLET | Refills: 0 | Status: SHIPPED | OUTPATIENT
Start: 2024-01-30

## 2024-01-31 ENCOUNTER — TELEPHONE (OUTPATIENT)
Dept: INTERNAL MEDICINE | Facility: CLINIC | Age: 74
End: 2024-01-31
Payer: MEDICARE

## 2024-01-31 NOTE — TELEPHONE ENCOUNTER
Received call back from patient.  Spoke with her in regards to lab work and to make sure to take her atorvastatin at night so as to reach better outcomes.  Will need to schedule follow-up appointment in 6 months with provider.  Patient verbalized understanding.

## 2024-01-31 NOTE — PROGRESS NOTES
"Chief Complaint  Follow-up (Follow-up on medication )    Subjective        Gregoria Claire presents to [facility] for follow-up for depression and medication change as well as cholesterol.    Patient has been battling with depression for some time now.  We have had her on multiple medications, most recently on Trintellix, we increased at her last appointment to 20 mg once daily.  Patient reports that there has been very little change.  Her primary concern is just lack of motivation, tired all the time, just wanting to sleep.  She has started to become disengaged with friends and family.  She does not report any harm to self or others.  She has tried to therapy in the past, but has not had good experiences with that.    Patient also following up on cholesterol, she has been elevated previously, we started her on Lipitor daily, she has been taking that regularly.  She denies any other concerns or complaints today.    Objective   Vital Signs:   /80 (BP Location: Left arm, Patient Position: Sitting, Cuff Size: Adult)   Pulse 67   Temp 95.7 °F (35.4 °C) (Temporal)   Resp 14   Ht 170.2 cm (67.01\")   Wt 80 kg (176 lb 6.4 oz)   SpO2 97%   BMI 27.62 kg/m²     Physical Exam  Vitals and nursing note reviewed.   Constitutional:       Appearance: Normal appearance.   Cardiovascular:      Rate and Rhythm: Normal rate.   Pulmonary:      Effort: Pulmonary effort is normal.   Neurological:      Mental Status: She is alert.   Psychiatric:         Attention and Perception: Attention normal.         Mood and Affect: Mood is depressed. Affect is flat.         Speech: Speech normal.         Behavior: Behavior normal.         Judgment: Judgment normal.        Result Review :  {The following data was reviewed by IAN Thomson on 01/31/24                Diagnoses and all orders for this visit:    1. Major depressive disorder, recurrent episode, moderate degree (Primary)  -     Ambulatory Referral " to Psychiatry    2. Dyslipidemia  -     Lipid Panel    Other orders  -     Vortioxetine HBr (Trintellix) 20 MG tablet; Take 1 tablet by mouth Daily With Breakfast.  Dispense: 30 tablet; Refill: 0    Had a long discussion today with the patient, she is really adamant about therapy, she does not want to try that again as she has had poor experiences.  I did discuss that therapy and medication together would likely yield better long-term effects and short-term effects.  She continues to defer at this time.  I did discuss with her a referral to psychiatry, whom that individual could help with her medication management, which she is not wanting to change anything just yet.  They can also provide some psychotherapy during those appointments.  She would like to see a provider here locally so she does not have to drive this far.  She denies any harm to self or others.    Cholesterol will be checked today, she has been tolerating medication well, denies any significant side effects.  Will follow-up with her additionally when lab work is returned.    I spent greater than 40 minutes caring for Gregoria on this date of service. This time includes time spent by me in the following activities:preparing for the visit, performing a medically appropriate examination and/or evaluation , counseling and educating the patient/family/caregiver, ordering medications, tests, or procedures, referring and communicating with other health care professionals , and documenting information in the medical record  Follow Up   No follow-ups on file.  Patient was given instructions and counseling regarding her condition or for health maintenance advice. Please see specific information pulled into the AVS if appropriate.     Reggie Stockton, IAN  1/31/2024  This note was electronically signed.

## 2024-02-01 ENCOUNTER — TELEPHONE (OUTPATIENT)
Dept: SLEEP MEDICINE | Facility: HOSPITAL | Age: 74
End: 2024-02-01
Payer: MEDICARE

## 2024-02-20 ENCOUNTER — TELEPHONE (OUTPATIENT)
Dept: INTERNAL MEDICINE | Facility: CLINIC | Age: 74
End: 2024-02-20
Payer: MEDICARE

## 2024-02-20 RX ORDER — ATORVASTATIN CALCIUM 10 MG/1
10 TABLET, FILM COATED ORAL DAILY
Qty: 90 TABLET | Refills: 0 | Status: SHIPPED | OUTPATIENT
Start: 2024-02-20

## 2024-03-18 ENCOUNTER — OFFICE VISIT (OUTPATIENT)
Dept: BEHAVIORAL HEALTH | Facility: CLINIC | Age: 74
End: 2024-03-18
Payer: MEDICARE

## 2024-03-18 VITALS
OXYGEN SATURATION: 96 % | HEART RATE: 94 BPM | SYSTOLIC BLOOD PRESSURE: 163 MMHG | HEIGHT: 67 IN | BODY MASS INDEX: 26.72 KG/M2 | DIASTOLIC BLOOD PRESSURE: 73 MMHG | WEIGHT: 170.25 LBS

## 2024-03-18 DIAGNOSIS — F33.2 SEVERE EPISODE OF RECURRENT MAJOR DEPRESSIVE DISORDER, WITHOUT PSYCHOTIC FEATURES: Primary | ICD-10-CM

## 2024-03-18 DIAGNOSIS — F41.1 GENERALIZED ANXIETY DISORDER: ICD-10-CM

## 2024-03-18 RX ORDER — ARIPIPRAZOLE 2 MG/1
2 TABLET ORAL DAILY
Qty: 30 TABLET | Refills: 1 | Status: SHIPPED | OUTPATIENT
Start: 2024-03-18

## 2024-03-18 NOTE — PATIENT INSTRUCTIONS
1.  Please return to clinic at your next scheduled visit.  Please contact the clinic ([phone]) at least 24 hours prior in the event you need to cancel.  2.  Do no harm to yourself or others.    3.  Avoid alcohol and drugs.    4.  Take all medications as prescribed.  Please contact the clinic with any concerns. If you are in need of medication refills, please call the clinic at [phone].    5. Should you want to get in touch with your provider, IAN Ford, please contact the office ([phone]), and staff will be able to page Jodi directly.  6. In the event you have personal crisis, contact the following crisis numbers: Suicide Prevention Hotline 1-707.397.7766; [facility] Helpline 9-518-352-GIRF; [facility] Room [phone]; text HELLO to 175538; or 933.     SPECIFIC RECOMMENDATIONS:     1.      Medications discussed at this encounter:     New Medications Ordered This Visit   Medications    ARIPiprazole (Abilify) 2 MG tablet     Sig: Take 1 tablet by mouth Daily.     Dispense:  30 tablet     Refill:  1                       2.      Psychotherapy recommendations: We will continue therapy at future visits.     3.     Return to clinic: Return in about 6 weeks (around 4/29/2024) for Next scheduled follow up.    No
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
## 2024-04-01 RX ORDER — VORTIOXETINE 20 MG/1
20 TABLET, FILM COATED ORAL
Qty: 30 TABLET | Refills: 0 | Status: SHIPPED | OUTPATIENT
Start: 2024-04-01

## 2024-04-04 RX ORDER — LEVOTHYROXINE SODIUM 0.15 MG/1
150 TABLET ORAL EVERY MORNING
Qty: 180 TABLET | Refills: 0 | Status: SHIPPED | OUTPATIENT
Start: 2024-04-04

## 2024-04-04 NOTE — TELEPHONE ENCOUNTER
Caller: Gregoria Claire    Relationship: Self    Best call back number: [phone]    Requested Prescriptions:   Requested Prescriptions     Pending Prescriptions Disp Refills    levothyroxine (SYNTHROID, LEVOTHROID) 150 MCG tablet 180 tablet 0     Sig: Take 1 tablet by mouth Every Morning.        Pharmacy where request should be sent: [facility] - [place], [place] - [address] [facility] - [phone]  - [phone] FX     Last office visit with prescribing clinician: 1/30/2024   Last telemedicine visit with prescribing clinician: Visit date not found   Next office visit with prescribing clinician: Visit date not found     Additional details provided by patient: PATIENT IS NEEDING REFILL AS SHE IS ALMOST OUT OF THIS.     Does the patient have less than a 3 day supply:  [x] Yes  [] No    Would you like a call back once the refill request has been completed: [] Yes [x] No    If the office needs to give you a call back, can they leave a voicemail: [] Yes [x] No    Lino Shepherd   04/04/24 14:45 EDT

## 2024-04-23 ENCOUNTER — TELEPHONE (OUTPATIENT)
Dept: INTERNAL MEDICINE | Facility: CLINIC | Age: 74
End: 2024-04-23
Payer: MEDICARE

## 2024-04-23 DIAGNOSIS — E03.9 HYPOTHYROIDISM, UNSPECIFIED TYPE: Primary | ICD-10-CM

## 2024-04-23 NOTE — TELEPHONE ENCOUNTER
Caller: Gregoria Claire    Relationship: Self    Best call back number: [phone]     What is the best time to reach you: ANY     Who are you requesting to speak with (clinical staff, provider,  specific staff member): CLINICAL     What was the call regarding: CALLER STATED THAT SHE IS NEEDING TO KNOW WHEN HER LAST THYROID BLOOD WORK WAS DONE AND WHEN SHE IS DUE TO HAVE THIS AGAIN     Is it okay if the provider responds through MyChart: NO

## 2024-04-24 ENCOUNTER — TELEPHONE (OUTPATIENT)
Dept: INTERNAL MEDICINE | Facility: CLINIC | Age: 74
End: 2024-04-24
Payer: MEDICARE

## 2024-04-24 NOTE — TELEPHONE ENCOUNTER
Hub staff attempted to follow warm transfer process and was unsuccessful     Caller: Gregoria Claire    Relationship to patient: Self    Best call back number: [phone]     Patient is needing: PATIENT CALLING TO SPEAK WITH CLINICAL STAFF AND SEE IF SHE NEEDS AN APPOINTMENT TO HAVE AN X-RAY OF HER CHEST. PLEASE ADVISE

## 2024-05-01 ENCOUNTER — OFFICE VISIT (OUTPATIENT)
Dept: BEHAVIORAL HEALTH | Facility: CLINIC | Age: 74
End: 2024-05-01
Payer: MEDICARE

## 2024-05-01 ENCOUNTER — OFFICE VISIT (OUTPATIENT)
Dept: INTERNAL MEDICINE | Facility: CLINIC | Age: 74
End: 2024-05-01
Payer: MEDICARE

## 2024-05-01 VITALS
SYSTOLIC BLOOD PRESSURE: 132 MMHG | BODY MASS INDEX: 26.93 KG/M2 | DIASTOLIC BLOOD PRESSURE: 80 MMHG | OXYGEN SATURATION: 95 % | HEART RATE: 63 BPM | TEMPERATURE: 97.8 F | RESPIRATION RATE: 16 BRPM | HEIGHT: 67 IN | WEIGHT: 171.6 LBS

## 2024-05-01 VITALS
SYSTOLIC BLOOD PRESSURE: 130 MMHG | HEIGHT: 67 IN | OXYGEN SATURATION: 95 % | BODY MASS INDEX: 26.84 KG/M2 | HEART RATE: 66 BPM | WEIGHT: 171 LBS | DIASTOLIC BLOOD PRESSURE: 72 MMHG

## 2024-05-01 DIAGNOSIS — R53.82 CHRONIC FATIGUE: ICD-10-CM

## 2024-05-01 DIAGNOSIS — F41.1 GENERALIZED ANXIETY DISORDER: ICD-10-CM

## 2024-05-01 DIAGNOSIS — E03.9 HYPOTHYROIDISM, UNSPECIFIED TYPE: ICD-10-CM

## 2024-05-01 DIAGNOSIS — F33.2 SEVERE EPISODE OF RECURRENT MAJOR DEPRESSIVE DISORDER, WITHOUT PSYCHOTIC FEATURES: Primary | ICD-10-CM

## 2024-05-01 DIAGNOSIS — R32 URINARY INCONTINENCE, UNSPECIFIED TYPE: ICD-10-CM

## 2024-05-01 DIAGNOSIS — I10 PRIMARY HYPERTENSION: ICD-10-CM

## 2024-05-01 DIAGNOSIS — R06.02 SHORTNESS OF BREATH: Primary | ICD-10-CM

## 2024-05-01 PROCEDURE — 99214 OFFICE O/P EST MOD 30 MIN: CPT | Performed by: NURSE PRACTITIONER

## 2024-05-01 PROCEDURE — 3075F SYST BP GE 130 - 139MM HG: CPT | Performed by: NURSE PRACTITIONER

## 2024-05-01 PROCEDURE — 3079F DIAST BP 80-89 MM HG: CPT | Performed by: NURSE PRACTITIONER

## 2024-05-01 PROCEDURE — 93000 ELECTROCARDIOGRAM COMPLETE: CPT | Performed by: NURSE PRACTITIONER

## 2024-05-01 NOTE — PROGRESS NOTES
"Chief Complaint  Shortness of Breath (For the last 2 months ) and Fatigue (For the last 2 months )    Subjective        Gregoria Claire presents to [facility] for concerns for shortness of breath, fatigue.  Patient reports over the last couple of months she feels like she is experiencing increased shortness of breath, difficulty with walking normal distances, feeling like she needs to sit down and rest.  She has been complaining of fatigue for quite some time, we have not had any solid answers as to why.  She is having her depression treated, which has been resistant, they have increased Abilify, but patient still not at goal.  She denies any chest pain.  There is no swelling of hands or feet.  She does not complain of any cough, wheezing.  She reports family history of lung cancer, however that person did smoke and had multiple other problems including cough and wheezing.  She is due for hypothyroidism labs.  Her blood pressure has remained stable.  No significant dizziness or passing out.  She is wanting to see a female urologist regarding her urinary incontinence.  We did try some medications previously, but was ineffective.    Objective   Vital Signs:   /80 (BP Location: Left arm, Patient Position: Sitting, Cuff Size: Adult)   Pulse 63   Temp 97.8 °F (36.6 °C) (Temporal)   Resp 16   Ht 170.2 cm (67.01\")   Wt 77.8 kg (171 lb 9.6 oz)   SpO2 95%   BMI 26.87 kg/m²     Physical Exam  Vitals and nursing note reviewed.   Constitutional:       Appearance: Normal appearance.   HENT:      Head: Normocephalic and atraumatic.      Right Ear: External ear normal.      Left Ear: External ear normal.      Nose: Nose normal.      Mouth/Throat:      Mouth: Mucous membranes are moist.   Eyes:      Pupils: Pupils are equal, round, and reactive to light.   Cardiovascular:      Rate and Rhythm: Normal rate and regular rhythm.      Pulses: Normal pulses.      Heart sounds: Normal heart sounds. "   Pulmonary:      Effort: Pulmonary effort is normal.      Breath sounds: Normal breath sounds.   Musculoskeletal:      Cervical back: Normal range of motion and neck supple.   Skin:     Capillary Refill: Capillary refill takes less than 2 seconds.   Neurological:      Mental Status: She is alert.        Result Review :  {The following data was reviewed by IAN Thomson on 05/01/24         ECG 12 Lead    Date/Time: 5/1/2024 3:41 PM  Performed by: Reggie Stockton APRN    Authorized by: Reggie Stockton APRN  Comparison: compared with previous ECG from 6/16/2022  Similar to previous ECG  Rhythm: sinus rhythm and sinus bradycardia  Rate: normal  Conduction: conduction normal  ST Segments: ST segments normal  T Waves: T waves normal  QRS axis: normal  Other: no other findings    Clinical impression: normal ECG              Diagnoses and all orders for this visit:    1. Shortness of breath (Primary)  -     Comprehensive Metabolic Panel  -     proBNP  -     CK  -     CBC & Differential  -     High Sensitivity Troponin T  -     XR Chest PA & Lateral    2. Chronic fatigue  -     Comprehensive Metabolic Panel  -     proBNP  -     CK  -     CBC & Differential    3. Primary hypertension    4. Hypothyroidism, unspecified type    5. Urinary incontinence, unspecified type  -     Ambulatory Referral to Urology    Other orders  -     ECG 12 Lead    EKG performed in office today, no significant abnormal findings.  We will proceed with chest x-ray, lab work.  Will follow-up with her additionally when results are available.  If she has any severe worsening of symptoms, recommend she go directly to the emergency room.  Verbalizing understanding.  I will go ahead and refer to urology for urinary incontinence.    Thyroid labs drawn today, we will follow-up with those results and make any adjustments as needed.  Blood pressure continues to be well-controlled, no changes made to medication regimen today.  We will follow-up additionally  once all results are available.      Follow Up   No follow-ups on file.  Patient was given instructions and counseling regarding her condition or for health maintenance advice. Please see specific information pulled into the AVS if appropriate.     Reggie Stockton, IAN  5/1/2024  This note was electronically signed.
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
## 2024-05-02 ENCOUNTER — CLINICAL SUPPORT (OUTPATIENT)
Dept: INTERNAL MEDICINE | Facility: CLINIC | Age: 74
End: 2024-05-02
Payer: MEDICARE

## 2024-05-02 DIAGNOSIS — E03.9 HYPOTHYROIDISM, UNSPECIFIED TYPE: ICD-10-CM

## 2024-05-02 DIAGNOSIS — I10 PRIMARY HYPERTENSION: ICD-10-CM

## 2024-05-02 DIAGNOSIS — E78.5 DYSLIPIDEMIA: Primary | ICD-10-CM

## 2024-05-02 DIAGNOSIS — R00.2 PALPITATIONS: ICD-10-CM

## 2024-05-02 LAB
ALBUMIN SERPL-MCNC: 4.3 G/DL (ref 3.5–5.2)
ALBUMIN/GLOB SERPL: 1.7 G/DL
ALP SERPL-CCNC: 55 U/L (ref 39–117)
ALT SERPL W P-5'-P-CCNC: 19 U/L (ref 1–33)
ANION GAP SERPL CALCULATED.3IONS-SCNC: 10 MMOL/L (ref 5–15)
AST SERPL-CCNC: 20 U/L (ref 1–32)
BASOPHILS # BLD AUTO: 0.06 10*3/MM3 (ref 0–0.2)
BASOPHILS NFR BLD AUTO: 1 % (ref 0–1.5)
BILIRUB SERPL-MCNC: 0.4 MG/DL (ref 0–1.2)
BUN SERPL-MCNC: 14 MG/DL (ref 8–23)
BUN/CREAT SERPL: 24.6 (ref 7–25)
CALCIUM SPEC-SCNC: 9.6 MG/DL (ref 8.6–10.5)
CHLORIDE SERPL-SCNC: 104 MMOL/L (ref 98–107)
CK SERPL-CCNC: 85 U/L (ref 20–180)
CO2 SERPL-SCNC: 24 MMOL/L (ref 22–29)
CREAT SERPL-MCNC: 0.57 MG/DL (ref 0.57–1)
DEPRECATED RDW RBC AUTO: 38.3 FL (ref 37–54)
EGFRCR SERPLBLD CKD-EPI 2021: 96.1 ML/MIN/1.73
EOSINOPHIL # BLD AUTO: 0.13 10*3/MM3 (ref 0–0.4)
EOSINOPHIL NFR BLD AUTO: 2.1 % (ref 0.3–6.2)
ERYTHROCYTE [DISTWIDTH] IN BLOOD BY AUTOMATED COUNT: 12.1 % (ref 12.3–15.4)
GLOBULIN UR ELPH-MCNC: 2.5 GM/DL
GLUCOSE SERPL-MCNC: 97 MG/DL (ref 65–99)
HCT VFR BLD AUTO: 40.4 % (ref 34–46.6)
HGB BLD-MCNC: 13.4 G/DL (ref 12–15.9)
IMM GRANULOCYTES # BLD AUTO: 0.02 10*3/MM3 (ref 0–0.05)
IMM GRANULOCYTES NFR BLD AUTO: 0.3 % (ref 0–0.5)
LYMPHOCYTES # BLD AUTO: 1.27 10*3/MM3 (ref 0.7–3.1)
LYMPHOCYTES NFR BLD AUTO: 20.5 % (ref 19.6–45.3)
MCH RBC QN AUTO: 29 PG (ref 26.6–33)
MCHC RBC AUTO-ENTMCNC: 33.2 G/DL (ref 31.5–35.7)
MCV RBC AUTO: 87.4 FL (ref 79–97)
MONOCYTES # BLD AUTO: 0.54 10*3/MM3 (ref 0.1–0.9)
MONOCYTES NFR BLD AUTO: 8.7 % (ref 5–12)
NEUTROPHILS NFR BLD AUTO: 4.17 10*3/MM3 (ref 1.7–7)
NEUTROPHILS NFR BLD AUTO: 67.4 % (ref 42.7–76)
NRBC BLD AUTO-RTO: 0 /100 WBC (ref 0–0.2)
NT-PROBNP SERPL-MCNC: 54 PG/ML (ref 0–900)
PLATELET # BLD AUTO: 398 10*3/MM3 (ref 140–450)
PMV BLD AUTO: 10 FL (ref 6–12)
POTASSIUM SERPL-SCNC: 4.3 MMOL/L (ref 3.5–5.2)
PROT SERPL-MCNC: 6.8 G/DL (ref 6–8.5)
RBC # BLD AUTO: 4.62 10*6/MM3 (ref 3.77–5.28)
SODIUM SERPL-SCNC: 138 MMOL/L (ref 136–145)
T4 FREE SERPL-MCNC: 1.74 NG/DL (ref 0.93–1.7)
TROPONIN T SERPL HS-MCNC: 13 NG/L
TSH SERPL DL<=0.05 MIU/L-ACNC: 0.47 UIU/ML (ref 0.27–4.2)
WBC NRBC COR # BLD AUTO: 6.19 10*3/MM3 (ref 3.4–10.8)

## 2024-05-02 PROCEDURE — 84439 ASSAY OF FREE THYROXINE: CPT | Performed by: NURSE PRACTITIONER

## 2024-05-02 PROCEDURE — 36415 COLL VENOUS BLD VENIPUNCTURE: CPT | Performed by: NURSE PRACTITIONER

## 2024-05-02 PROCEDURE — 85025 COMPLETE CBC W/AUTO DIFF WBC: CPT | Performed by: NURSE PRACTITIONER

## 2024-05-02 PROCEDURE — 82550 ASSAY OF CK (CPK): CPT | Performed by: NURSE PRACTITIONER

## 2024-05-02 PROCEDURE — 83880 ASSAY OF NATRIURETIC PEPTIDE: CPT | Performed by: NURSE PRACTITIONER

## 2024-05-02 PROCEDURE — 80053 COMPREHEN METABOLIC PANEL: CPT | Performed by: NURSE PRACTITIONER

## 2024-05-02 PROCEDURE — 84484 ASSAY OF TROPONIN QUANT: CPT | Performed by: NURSE PRACTITIONER

## 2024-05-02 PROCEDURE — 84443 ASSAY THYROID STIM HORMONE: CPT | Performed by: NURSE PRACTITIONER

## 2024-05-02 NOTE — PROGRESS NOTES
Venipuncture Blood Specimen Collection  Venipuncture performed in [facility] by Suzette Lira MA with good hemostasis. Patient tolerated the procedure well without complications.   05/02/24   Suzette Lira MA

## 2024-05-03 DIAGNOSIS — F33.1 MAJOR DEPRESSIVE DISORDER, RECURRENT EPISODE, MODERATE DEGREE: Primary | ICD-10-CM

## 2024-05-03 RX ORDER — VORTIOXETINE 20 MG/1
20 TABLET, FILM COATED ORAL
Qty: 30 TABLET | Refills: 2 | Status: SHIPPED | OUTPATIENT
Start: 2024-05-03

## 2024-05-03 RX ORDER — ARIPIPRAZOLE 5 MG/1
5 TABLET ORAL DAILY
Qty: 30 TABLET | Refills: 1 | Status: SHIPPED | OUTPATIENT
Start: 2024-05-03

## 2024-05-03 NOTE — PATIENT INSTRUCTIONS
1.  Please return to clinic at your next scheduled visit.  Please contact the clinic ([phone]) at least 24 hours prior in the event you need to cancel.  2.  Do no harm to yourself or others.    3.  Avoid alcohol and drugs.    4.  Take all medications as prescribed.  Please contact the clinic with any concerns. If you are in need of medication refills, please call the clinic at [phone].    5. Should you want to get in touch with your provider, IAN Ford, please contact the office ([phone]), and staff will be able to page Jodi directly.  6. In the event you have personal crisis, contact the following crisis numbers: Suicide Prevention Hotline 1-946.794.7351; [facility] Helpline 3-691-907-ZNUB; [facility] Emergency Room [phone]; text HELLO to 748670; or 931.     SPECIFIC RECOMMENDATIONS:     1.      Medications discussed at this encounter:     New Medications Ordered This Visit   Medications    ARIPiprazole (ABILIFY) 5 MG tablet     Sig: Take 1 tablet by mouth Daily.     Dispense:  30 tablet     Refill:  1                       2.      Psychotherapy recommendations: We will continue therapy at future visits.     3.     Return to clinic: Return in about 3 months (around 8/1/2024) for Next scheduled follow up.

## 2024-05-03 NOTE — TELEPHONE ENCOUNTER
PT IS NEEDING A REFILL ON HER TRINTELLIX 20 MG TABLET.  Trintellix 20 MG tablet (04/01/2024)     FOLLOW UP APPT ON 08/21/2024.  PT LAST SEEN ON 05/01/2024.    PT REPORTS THAT PROVIDER RUSTY IS TAKING OVER THIS MEDICATION.    ORDER IS PENDED.

## 2024-06-14 NOTE — PROGRESS NOTES
Chief Complaint: Urinary Incontinence    Subjective         History of Present Illness  Gregoria Claire is a 73 y.o. female presents to [facility] to be seen for incontinence.      Patient presents reporting she is having urinary frequency. Has to get up hourly day and night. She reports if she even thinks about water she has to go to bathroom.  She also reports that when she wipes she does feel a bulge in the vaginal area and if this is pushed on she does urinate more.    Previously tried oxybutynin- did not see any improvement she was taking it up to 5 times per day.  She reports she was also tried on a different medication for this but does not recall the name.    Frequency-admits    Urgency-admits    Incontinence-admits with urgency    Nocturia-every hour    GH-denies    History of stones-denies     surgeries-had scar tissue from her hysterectomy and had to have this removed from her bladder    Family history of  malignancy-denies    Cardiopulmonary-HTN, breast CA    Anticoagulants-denies    Smoker-denies    Objective     Past Medical History:   Diagnosis Date    Breast cancer     Depression     Hypertension     Hyperthyroidism        Past Surgical History:   Procedure Laterality Date    HYSTERECTOMY      MASTECTOMY           Current Outpatient Medications:     ARIPiprazole (ABILIFY) 5 MG tablet, Take 1 tablet by mouth Daily., Disp: 30 tablet, Rfl: 1    atorvastatin (Lipitor) 10 MG tablet, Take 1 tablet by mouth Daily., Disp: 90 tablet, Rfl: 0    levothyroxine (SYNTHROID, LEVOTHROID) 150 MCG tablet, Take 1 tablet by mouth Every Morning., Disp: 180 tablet, Rfl: 0    Vortioxetine HBr (Trintellix) 20 MG tablet, Take 1 tablet by mouth Daily With Breakfast., Disp: 30 tablet, Rfl: 2    Mirabegron ER (Myrbetriq) 25 MG tablet sustained-release 24 hour 24 hr tablet, Take 1 tablet by mouth Daily., Disp: 30 tablet, Rfl: 3    No Known Allergies     Family History   Problem Relation Age of Onset  "   Sleep apnea Sister     Restless legs syndrome Sister     Thyroid disease Sister        Social History     Socioeconomic History    Marital status:    Tobacco Use    Smoking status: Never     Passive exposure: Never    Smokeless tobacco: Never   Vaping Use    Vaping status: Never Used   Substance and Sexual Activity    Alcohol use: Never    Drug use: Never    Sexual activity: Defer       Vital Signs:   /70 (BP Location: Left arm, Patient Position: Sitting, Cuff Size: Adult)   Pulse 72   Ht 170.2 cm (67.01\")   Wt 76.9 kg (169 lb 9.6 oz)   BMI 26.56 kg/m²      Physical Exam  Vitals reviewed.   Constitutional:       Appearance: Normal appearance.   Genitourinary:     Comments: Grade 3 bladder prolapse  Neurological:      General: No focal deficit present.      Mental Status: She is alert and oriented to person, place, and time.   Psychiatric:         Mood and Affect: Mood normal.         Behavior: Behavior normal.          Result Review :   The following data was reviewed by: IAN Gutierres on 06/18/2024:      Bladder Scan interpretation 06/18/2024    Estimation of residual urine via [employer] 3000 Verathon Bladder Scan  MA/nurse performing: Marianne DELGADO MA  Residual Urine: 0 ml  Indication: Urinary incontinence, unspecified type    Overactive bladder    Female bladder prolapse   Position: Supine  Examination: Incremental scanning of the suprapubic area using 2.0 MHz transducer using copious amounts of acoustic gel.   Findings: An anechoic area was demonstrated which represented the bladder, with measurement of residual urine as noted. I inspected this myself. In that the residual urine was stable or insignificant, refer to plan for treatment and plan necessary at this time.                Procedures        Assessment and Plan    Diagnoses and all orders for this visit:    1. Urinary incontinence, unspecified type (Primary)  -     Bladder Scan  -     Ambulatory Referral to Gynecologic " Urology    2. Overactive bladder  -     Mirabegron ER (Myrbetriq) 25 MG tablet sustained-release 24 hour 24 hr tablet; Take 1 tablet by mouth Daily.  Dispense: 30 tablet; Refill: 3    3. Female bladder prolapse  -     Ambulatory Referral to Gynecologic Urology    Overactive bladder-discussed with patient at length, all questions addressed. Discussed with patient that urinary urgency and frequency due to overactive bladder is a common condition that is multifactorial in nature, frequently difficult to treat, unlikely to cure, and management is dictated by patient motivation to improve and cope with symptoms.     Given that she has tried to anticholinergic medications without relief of overactive bladder symptoms, I am going to start her on Myrbetriq.  We did discuss that it can take up to 6 weeks to see full results with this medication.    I am also going to refer her to urogynecology for evaluation of bladder prolapse and possible repair.  We did discuss options of pessary versus repair, but she would like to see a urogynecologist to get this evaluated.    I will see her back in 6 weeks or sooner for new concerns.    Follow Up   Return in about 6 weeks (around 7/30/2024).  Patient was given instructions and counseling regarding her condition or for health maintenance advice. Please see specific information pulled into the AVS if appropriate.         This document has been electronically signed by IAN Gutierres  June 18, 2024 13:53 EDT

## 2024-06-18 ENCOUNTER — OFFICE VISIT (OUTPATIENT)
Dept: UROLOGY | Facility: CLINIC | Age: 74
End: 2024-06-18
Payer: MEDICARE

## 2024-06-18 VITALS
SYSTOLIC BLOOD PRESSURE: 128 MMHG | DIASTOLIC BLOOD PRESSURE: 70 MMHG | HEART RATE: 72 BPM | HEIGHT: 67 IN | WEIGHT: 169.6 LBS | BODY MASS INDEX: 26.62 KG/M2

## 2024-06-18 DIAGNOSIS — R32 URINARY INCONTINENCE, UNSPECIFIED TYPE: Primary | ICD-10-CM

## 2024-06-18 DIAGNOSIS — N81.10 FEMALE BLADDER PROLAPSE: ICD-10-CM

## 2024-06-18 DIAGNOSIS — N32.81 OVERACTIVE BLADDER: ICD-10-CM

## 2024-06-18 LAB — URINE VOLUME: 0

## 2024-06-18 RX ORDER — MIRABEGRON 25 MG/1
25 TABLET, FILM COATED, EXTENDED RELEASE ORAL DAILY
Qty: 30 TABLET | Refills: 3 | Status: SHIPPED | OUTPATIENT
Start: 2024-06-18

## 2024-06-25 RX ORDER — ATORVASTATIN CALCIUM 10 MG/1
10 TABLET, FILM COATED ORAL DAILY
Qty: 90 TABLET | Refills: 0 | Status: SHIPPED | OUTPATIENT
Start: 2024-06-25

## 2024-06-25 NOTE — TELEPHONE ENCOUNTER
Rx Refill Note  Requested Prescriptions     Pending Prescriptions Disp Refills    atorvastatin (Lipitor) 10 MG tablet 90 tablet 0     Sig: Take 1 tablet by mouth Daily.      Last office visit with prescribing clinician: 5/1/2024   Last telemedicine visit with prescribing clinician: Visit date not found   Next office visit with prescribing clinician: Visit date not found                         Would you like a call back once the refill request has been completed: [] Yes [] No    If the office needs to give you a call back, can they leave a voicemail: [] Yes [] No    Pina Javed MA  06/25/24, 15:33 EDT

## 2024-07-26 ENCOUNTER — TRANSCRIBE ORDERS (OUTPATIENT)
Dept: ADMINISTRATIVE | Facility: HOSPITAL | Age: 74
End: 2024-07-26
Payer: MEDICARE

## 2024-07-26 ENCOUNTER — TELEPHONE (OUTPATIENT)
Dept: UROLOGY | Facility: CLINIC | Age: 74
End: 2024-07-26
Payer: MEDICARE

## 2024-07-26 DIAGNOSIS — Z12.31 SCREENING MAMMOGRAM FOR BREAST CANCER: Primary | ICD-10-CM

## 2024-07-26 NOTE — TELEPHONE ENCOUNTER
Tried to call patient, no answer and unable to leave message.  Need to give her appt information regarding referral to UroGyn office.  Her appt is with Dr Julissa Cameron on 10-10-24 at 1pm.  Address [address] [place] [address].  Their phone number is [phone].  Ok for [facility] to relay message if patient calls back.  Gertrude at Dr Vanegas office is also mailing information to patient along with paperwork.

## 2024-07-29 NOTE — PROGRESS NOTES
Chief Complaint: Urinary Incontinence    Subjective         History of Present Illness  Gregoria Claire is a 74 y.o. female presents to [facility] to be seen for follow-up.    Patient was previously seen by me with last visit on 6/18/2024 for overactive bladder.  We did start her on Myrbetriq at that visit.  She was also given a referral for urogynecology due to prolapsed bladder.  She does have an appointment with Dr. Tiffanie Cameron on 10/10/2024.  She is here for follow-up.    She reports she is still going frequently. She is not urinating as often as nighttime and is not having the nighttime incontinence.       Previous 6/18/2024:  Patient presents reporting she is having urinary frequency. Has to get up hourly day and night. She reports if she even thinks about water she has to go to bathroom.  She also reports that when she wipes she does feel a bulge in the vaginal area and if this is pushed on she does urinate more.     Previously tried oxybutynin- did not see any improvement she was taking it up to 5 times per day.  She reports she was also tried on a different medication for this but does not recall the name.     Frequency-admits     Urgency-admits     Incontinence-admits with urgency     Nocturia-every hour     GH-denies     History of stones-denies      surgeries-had scar tissue from her hysterectomy and had to have this removed from her bladder     Family history of  malignancy-denies     Cardiopulmonary-HTN, breast CA     Anticoagulants-denies     Smoker-denies          Objective     Past Medical History:   Diagnosis Date    Breast cancer     Depression     Hypertension     Hyperthyroidism        Past Surgical History:   Procedure Laterality Date    HYSTERECTOMY      MASTECTOMY           Current Outpatient Medications:     ARIPiprazole (ABILIFY) 5 MG tablet, Take 1 tablet by mouth Daily., Disp: 30 tablet, Rfl: 1    atorvastatin (Lipitor) 10 MG tablet, Take 1 tablet by mouth  "Daily., Disp: 90 tablet, Rfl: 0    levothyroxine (SYNTHROID, LEVOTHROID) 150 MCG tablet, Take 1 tablet by mouth Every Morning., Disp: 180 tablet, Rfl: 0    Mirabegron ER (Myrbetriq) 50 MG tablet sustained-release 24 hour 24 hr tablet, Take 50 mg by mouth Daily., Disp: 30 tablet, Rfl: 3    Vortioxetine HBr (Trintellix) 20 MG tablet, Take 1 tablet by mouth Daily With Breakfast., Disp: 30 tablet, Rfl: 2    No Known Allergies     Family History   Problem Relation Age of Onset    Sleep apnea Sister     Restless legs syndrome Sister     Thyroid disease Sister        Social History     Socioeconomic History    Marital status:    Tobacco Use    Smoking status: Never     Passive exposure: Never    Smokeless tobacco: Never   Vaping Use    Vaping status: Never Used   Substance and Sexual Activity    Alcohol use: Never    Drug use: Never    Sexual activity: Defer       Vital Signs:   Resp 12   Ht 170.2 cm (67.01\")   Wt 76.9 kg (169 lb 8.5 oz)   BMI 26.55 kg/m²      Physical Exam  Vitals reviewed.   Constitutional:       Appearance: Normal appearance.   Neurological:      General: No focal deficit present.      Mental Status: She is alert and oriented to person, place, and time.   Psychiatric:         Mood and Affect: Mood normal.         Behavior: Behavior normal.          Result Review :   The following data was reviewed by: IAN Gutierres on 07/30/2024:  Results for orders placed or performed in visit on 07/30/24   Bladder Scan   Result Value Ref Range    Urine Volume 0               Bladder Scan interpretation 07/30/2024    Estimation of residual urine via BVI 3000 Verathon Bladder Scan  MA/nurse performing: Marianne DELGADO MA  Residual Urine: 0 ml  Indication: Overactive bladder   Position: Supine  Examination: Incremental scanning of the suprapubic area using 2.0 MHz transducer using copious amounts of acoustic gel.   Findings: An anechoic area was demonstrated which represented the bladder, with " measurement of residual urine as noted. I inspected this myself. In that the residual urine was stable or insignificant, refer to plan for treatment and plan necessary at this time.           Procedures        Assessment and Plan    Diagnoses and all orders for this visit:    1. Overactive bladder (Primary)  -     Mirabegron ER (Myrbetriq) 50 MG tablet sustained-release 24 hour 24 hr tablet; Take 50 mg by mouth Daily.  Dispense: 30 tablet; Refill: 3  -     Bladder Scan    I am going to increase her Myrbetriq to 50 mg daily.  Hopefully this will help with her urinary frequency since her incontinence has improved with this.  She is scheduled to see Dr. Cameron on October 10, she will keep that appointment and then based on her recommendations make decisions about follow-up with me.      Follow Up   No follow-ups on file.  Patient was given instructions and counseling regarding her condition or for health maintenance advice. Please see specific information pulled into the AVS if appropriate.         This document has been electronically signed by IAN Gutierres  July 30, 2024 13:48 EDT

## 2024-07-30 ENCOUNTER — OFFICE VISIT (OUTPATIENT)
Dept: UROLOGY | Facility: CLINIC | Age: 74
End: 2024-07-30
Payer: MEDICARE

## 2024-07-30 VITALS — HEIGHT: 67 IN | RESPIRATION RATE: 12 BRPM | WEIGHT: 169.53 LBS | BODY MASS INDEX: 26.61 KG/M2

## 2024-07-30 DIAGNOSIS — N32.81 OVERACTIVE BLADDER: Primary | ICD-10-CM

## 2024-07-30 LAB — URINE VOLUME: 0

## 2024-07-30 RX ORDER — MIRABEGRON 50 MG/1
50 TABLET, EXTENDED RELEASE ORAL DAILY
Qty: 30 TABLET | Refills: 3 | Status: SHIPPED | OUTPATIENT
Start: 2024-07-30

## 2024-08-05 ENCOUNTER — HOSPITAL ENCOUNTER (OUTPATIENT)
Dept: MAMMOGRAPHY | Facility: HOSPITAL | Age: 74
Discharge: HOME | End: 2024-08-05
Admitting: NURSE PRACTITIONER
Payer: MEDICARE

## 2024-08-05 DIAGNOSIS — Z12.31 SCREENING MAMMOGRAM FOR BREAST CANCER: ICD-10-CM

## 2024-08-05 PROCEDURE — 77063 BREAST TOMOSYNTHESIS BI: CPT

## 2024-08-05 PROCEDURE — 77067 SCR MAMMO BI INCL CAD: CPT

## 2024-08-21 ENCOUNTER — OFFICE VISIT (OUTPATIENT)
Dept: BEHAVIORAL HEALTH | Facility: CLINIC | Age: 74
End: 2024-08-21
Payer: MEDICARE

## 2024-08-21 VITALS
HEART RATE: 52 BPM | HEIGHT: 67 IN | SYSTOLIC BLOOD PRESSURE: 152 MMHG | DIASTOLIC BLOOD PRESSURE: 78 MMHG | WEIGHT: 172 LBS | BODY MASS INDEX: 27 KG/M2

## 2024-08-21 DIAGNOSIS — F33.2 SEVERE EPISODE OF RECURRENT MAJOR DEPRESSIVE DISORDER, WITHOUT PSYCHOTIC FEATURES: Primary | ICD-10-CM

## 2024-08-21 DIAGNOSIS — F41.1 GENERALIZED ANXIETY DISORDER: ICD-10-CM

## 2024-08-21 RX ORDER — VORTIOXETINE 20 MG/1
20 TABLET, FILM COATED ORAL
Qty: 90 TABLET | Refills: 1 | Status: SHIPPED | OUTPATIENT
Start: 2024-08-21

## 2024-08-21 RX ORDER — ARIPIPRAZOLE 5 MG/1
5 TABLET ORAL DAILY
Qty: 90 TABLET | Refills: 1 | Status: SHIPPED | OUTPATIENT
Start: 2024-08-21

## 2024-08-21 RX ORDER — BUPROPION HYDROCHLORIDE 150 MG/1
150 TABLET ORAL EVERY MORNING
Qty: 90 TABLET | Refills: 1 | Status: SHIPPED | OUTPATIENT
Start: 2024-08-21 | End: 2025-08-21

## 2024-08-21 NOTE — PATIENT INSTRUCTIONS
1.  Please return to clinic at your next scheduled visit.  Please contact the clinic ([phone]) at least 24 hours prior in the event you need to cancel.  2.  Do no harm to yourself or others.    3.  Avoid alcohol and drugs.    4.  Take all medications as prescribed.  Please contact the clinic with any concerns. If you are in need of medication refills, please call the clinic at [phone].    5. Should you want to get in touch with your provider, IAN Ford, please contact the office ([phone]), and staff will be able to page Jodi directly.  6. In the event you have personal crisis, contact the following crisis numbers: Suicide Prevention Hotline 1-235.613.5981; [facility] Helpline 2-355-827-MPRS; [facility] [phone]; text HELLO to 792928; or 107.     SPECIFIC RECOMMENDATIONS:     1.      Medications discussed at this encounter:     New Medications Ordered This Visit   Medications    buPROPion XL (Wellbutrin XL) 150 MG 24 hr tablet     Sig: Take 1 tablet by mouth Every Morning.     Dispense:  90 tablet     Refill:  1    ARIPiprazole (ABILIFY) 5 MG tablet     Sig: Take 1 tablet by mouth Daily.     Dispense:  90 tablet     Refill:  1    Vortioxetine HBr (Trintellix) 20 MG tablet     Sig: Take 1 tablet by mouth Daily With Breakfast.     Dispense:  90 tablet     Refill:  1                       2.      Psychotherapy recommendations: We will continue therapy at future visits.     3.     Return to clinic: Return in about 3 months (around 11/21/2024).
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
## 2024-10-08 RX ORDER — LEVOTHYROXINE SODIUM 150 UG/1
150 TABLET ORAL EVERY MORNING
Qty: 180 TABLET | Refills: 0 | Status: SHIPPED | OUTPATIENT
Start: 2024-10-08

## 2024-10-08 RX ORDER — ATORVASTATIN CALCIUM 10 MG/1
10 TABLET, FILM COATED ORAL DAILY
Qty: 90 TABLET | Refills: 0 | Status: SHIPPED | OUTPATIENT
Start: 2024-10-08

## 2024-10-22 ENCOUNTER — TELEPHONE (OUTPATIENT)
Dept: UROLOGY | Facility: CLINIC | Age: 74
End: 2024-10-22
Payer: MEDICARE

## 2024-10-22 NOTE — TELEPHONE ENCOUNTER
PT IS ASKING IF SHE CAN GET A SCRIPT FOR GEMTESA. SHE WAS SENT TO [place] FOR SOME TESTS AND THEY GAVE HER SAMPLES OF GEMTESA AND SHE LIKED IT BETTER THAN THE MYRBETRIQ WE PRESCRIBED. SHE DOES NOT WANT TO HAVE TO TRAVEL BACK AND FORTH TO [place] FOR MEDS IF YOU CAN FOLLOW HER CARE.

## 2024-11-04 DIAGNOSIS — N32.81 OVERACTIVE BLADDER: Primary | ICD-10-CM

## 2024-11-20 ENCOUNTER — OFFICE VISIT (OUTPATIENT)
Dept: BEHAVIORAL HEALTH | Facility: CLINIC | Age: 74
End: 2024-11-20
Payer: MEDICARE

## 2024-11-20 VITALS
DIASTOLIC BLOOD PRESSURE: 66 MMHG | SYSTOLIC BLOOD PRESSURE: 130 MMHG | HEART RATE: 84 BPM | HEIGHT: 67 IN | WEIGHT: 169 LBS | BODY MASS INDEX: 26.53 KG/M2

## 2024-11-20 DIAGNOSIS — F33.2 SEVERE EPISODE OF RECURRENT MAJOR DEPRESSIVE DISORDER, WITHOUT PSYCHOTIC FEATURES: Primary | ICD-10-CM

## 2024-11-20 DIAGNOSIS — F41.1 GENERALIZED ANXIETY DISORDER: ICD-10-CM

## 2024-11-20 DIAGNOSIS — F51.01 PRIMARY INSOMNIA: ICD-10-CM

## 2024-11-20 RX ORDER — ESTRADIOL 0.1 MG/G
CREAM VAGINAL
COMMUNITY
Start: 2024-10-11

## 2024-11-20 RX ORDER — TRAZODONE HYDROCHLORIDE 50 MG/1
50 TABLET, FILM COATED ORAL NIGHTLY
Qty: 90 TABLET | Refills: 1 | Status: SHIPPED | OUTPATIENT
Start: 2024-11-20

## 2024-11-20 RX ORDER — NITROFURANTOIN 25; 75 MG/1; MG/1
1 CAPSULE ORAL EVERY 12 HOURS SCHEDULED
COMMUNITY
Start: 2024-10-14
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
## 2024-11-28 NOTE — PATIENT INSTRUCTIONS
1.  Please return to clinic at your next scheduled visit.  Please contact the clinic ([phone]) at least 24 hours prior in the event you need to cancel.  2.  Do no harm to yourself or others.    3.  Avoid alcohol and drugs.    4.  Take all medications as prescribed.  Please contact the clinic with any concerns. If you are in need of medication refills, please call the clinic at [phone].    5. Should you want to get in touch with your provider, IAN Ford, please contact the office ([phone]), and staff will be able to page Jodi directly.  6. In the event you have personal crisis, contact the following crisis numbers: Suicide Prevention Hotline 1-713.973.5482; [facility] Helpline 1-653-075-DJCZ; [facility] Emergency Room [phone]; text HELLO to 792035; or 723.     SPECIFIC RECOMMENDATIONS:     1.      Medications discussed at this encounter:     New Medications Ordered This Visit   Medications    traZODone (DESYREL) 50 MG tablet     Sig: Take 1 tablet by mouth Every Night.     Dispense:  90 tablet     Refill:  1                       2.      Psychotherapy recommendations: We will continue therapy at future visits.     3.     Return to clinic: Return in about 3 months (around 2/20/2025).

## 2024-12-10 NOTE — PROGRESS NOTES
Chief Complaint: Overactive bladder    Subjective         History of Present Illness  Gregoria Claire is a 74 y.o. female presents to [facility] to be seen for follow-up.    Patient was previously seen by me with last visit on 7/30/2024 for overactive bladder.  At that visit I did increase her to Myrbetriq Dr. Cameron in October.  She did call in October stating that she had been given Gemtesa which was better than Myrbetriq and wanted to continue on that medication.  She is here for follow-up.    She reports she is doing better with Gemtesa. She is able to go 4 hours now at night and no longer has incontinence.     Previous 7/30/2024:  Patient was previously seen by me with last visit on 6/18/2024 for overactive bladder.  We did start her on Myrbetriq at that visit.  She was also given a referral for urogynecology due to prolapsed bladder.  She does have an appointment with Dr. Tiffanie Cameron on 10/10/2024.  She is here for follow-up.     She reports she is still going frequently. She is not urinating as often as nighttime and is not having the nighttime incontinence.         Previous 6/18/2024:  Patient presents reporting she is having urinary frequency. Has to get up hourly day and night. She reports if she even thinks about water she has to go to bathroom.  She also reports that when she wipes she does feel a bulge in the vaginal area and if this is pushed on she does urinate more.     Previously tried oxybutynin- did not see any improvement she was taking it up to 5 times per day.  She reports she was also tried on a different medication for this but does not recall the name.     Frequency-admits     Urgency-admits     Incontinence-admits with urgency     Nocturia-every hour     GH-denies     History of stones-denies      surgeries-had scar tissue from her hysterectomy and had to have this removed from her bladder     Family history of  malignancy-denies     Cardiopulmonary-HTN, breast  "CA     Anticoagulants-denies     Smoker-denies       Objective     Past Medical History:   Diagnosis Date    Breast cancer     Depression     Hypertension     Hyperthyroidism        Past Surgical History:   Procedure Laterality Date    HYSTERECTOMY      MASTECTOMY           Current Outpatient Medications:     ARIPiprazole (ABILIFY) 5 MG tablet, Take 1 tablet by mouth Daily., Disp: 90 tablet, Rfl: 1    atorvastatin (LIPITOR) 10 MG tablet, TAKE ONE TABLET BY MOUTH EVERY DAY, Disp: 90 tablet, Rfl: 0    buPROPion XL (Wellbutrin XL) 150 MG 24 hr tablet, Take 1 tablet by mouth Every Morning., Disp: 90 tablet, Rfl: 1    estradiol (ESTRACE) 0.1 MG/GM vaginal cream, Using applicator, insert 1 gram into the vagina 3 (three) times a week at bedtme, Disp: , Rfl:     levothyroxine (SYNTHROID, LEVOTHROID) 150 MCG tablet, Take 1 tablet by mouth Every Morning., Disp: 180 tablet, Rfl: 0    traZODone (DESYREL) 50 MG tablet, Take 1 tablet by mouth Every Night., Disp: 90 tablet, Rfl: 1    Vibegron 75 MG tablet, Take 1 tablet by mouth Daily for 360 days., Disp: 90 tablet, Rfl: 3    Vortioxetine HBr (Trintellix) 20 MG tablet, Take 1 tablet by mouth Daily With Breakfast., Disp: 90 tablet, Rfl: 1    nitrofurantoin, macrocrystal-monohydrate, (MACROBID) 100 MG capsule, Take 1 capsule by mouth Every 12 (Twelve) Hours., Disp: , Rfl:     No Known Allergies     Family History   Problem Relation Age of Onset    Sleep apnea Sister     Restless legs syndrome Sister     Thyroid disease Sister        Social History     Socioeconomic History    Marital status:    Tobacco Use    Smoking status: Never     Passive exposure: Never    Smokeless tobacco: Never   Vaping Use    Vaping status: Never Used   Substance and Sexual Activity    Alcohol use: Never    Drug use: Never    Sexual activity: Defer       Vital Signs:   Resp 12   Ht 170.2 cm (67.01\")   Wt 76.7 kg (169 lb 1.5 oz)   BMI 26.48 kg/m²      Physical Exam  Vitals reviewed. "   Constitutional:       Appearance: Normal appearance.   Neurological:      General: No focal deficit present.      Mental Status: She is alert and oriented to person, place, and time.   Psychiatric:         Mood and Affect: Mood normal.         Behavior: Behavior normal.          Result Review :   The following data was reviewed by: IAN Gutierres on 12/12/2024:  Results for orders placed or performed in visit on 12/12/24   Bladder Scan    Collection Time: 12/12/24 10:11 AM   Result Value Ref Range    Urine Volume 0       Bladder Scan interpretation 12/12/2024    Estimation of residual urine via BVI 3000 Verathon Bladder Scan  MA/nurse performing: Marianne DELGADO MA  Residual Urine: 0 ml  Indication: Overactive bladder   Position: Supine  Examination: Incremental scanning of the suprapubic area using 2.0 MHz transducer using copious amounts of acoustic gel.   Findings: An anechoic area was demonstrated which represented the bladder, with measurement of residual urine as noted. I inspected this myself. In that the residual urine was stable or insignificant, refer to plan for treatment and plan necessary at this time.           Procedures        Assessment and Plan    Diagnoses and all orders for this visit:    1. Overactive bladder (Primary)  -     Bladder Scan  -     Vibegron 75 MG tablet; Take 1 tablet by mouth Daily for 360 days.  Dispense: 90 tablet; Refill: 3    Given that she is doing well with Gemtesa we will continue with that medication.    I will see her back in 1 year or sooner for new concerns.    Follow Up   No follow-ups on file.  Patient was given instructions and counseling regarding her condition or for health maintenance advice. Please see specific information pulled into the AVS if appropriate.         This document has been electronically signed by IAN Gutierres  December 12, 2024 10:17 EST

## 2024-12-12 ENCOUNTER — OFFICE VISIT (OUTPATIENT)
Dept: UROLOGY | Age: 74
End: 2024-12-12
Payer: MEDICARE

## 2024-12-12 VITALS — RESPIRATION RATE: 12 BRPM | WEIGHT: 169.09 LBS | BODY MASS INDEX: 26.54 KG/M2 | HEIGHT: 67 IN

## 2024-12-12 DIAGNOSIS — N32.81 OVERACTIVE BLADDER: Primary | ICD-10-CM

## 2024-12-12 LAB — URINE VOLUME: 0

## 2025-02-17 ENCOUNTER — OFFICE VISIT (OUTPATIENT)
Dept: BEHAVIORAL HEALTH | Facility: CLINIC | Age: 75
End: 2025-02-17
Payer: MEDICARE

## 2025-02-17 VITALS
WEIGHT: 171 LBS | DIASTOLIC BLOOD PRESSURE: 85 MMHG | SYSTOLIC BLOOD PRESSURE: 146 MMHG | BODY MASS INDEX: 26.84 KG/M2 | HEIGHT: 67 IN | HEART RATE: 76 BPM

## 2025-02-17 DIAGNOSIS — F51.01 PRIMARY INSOMNIA: ICD-10-CM

## 2025-02-17 DIAGNOSIS — F41.1 GENERALIZED ANXIETY DISORDER: ICD-10-CM

## 2025-02-17 DIAGNOSIS — F33.2 SEVERE EPISODE OF RECURRENT MAJOR DEPRESSIVE DISORDER, WITHOUT PSYCHOTIC FEATURES: Primary | ICD-10-CM

## 2025-02-17 RX ORDER — VORTIOXETINE 20 MG/1
20 TABLET, FILM COATED ORAL
Qty: 90 TABLET | Refills: 1 | Status: SHIPPED | OUTPATIENT
Start: 2025-02-17

## 2025-02-17 RX ORDER — BUPROPION HYDROCHLORIDE 150 MG/1
150 TABLET ORAL EVERY MORNING
Qty: 90 TABLET | Refills: 1 | Status: SHIPPED | OUTPATIENT
Start: 2025-02-17 | End: 2026-02-17

## 2025-02-17 RX ORDER — ARIPIPRAZOLE 5 MG/1
5 TABLET ORAL DAILY
Qty: 90 TABLET | Refills: 1 | Status: SHIPPED | OUTPATIENT
Start: 2025-02-17

## 2025-02-17 RX ORDER — TRAZODONE HYDROCHLORIDE 50 MG/1
50 TABLET, FILM COATED ORAL NIGHTLY
Qty: 90 TABLET | Refills: 1 | Status: SHIPPED | OUTPATIENT
Start: 2025-02-17

## 2025-02-17 NOTE — PATIENT INSTRUCTIONS
1.  Please return to clinic at your next scheduled visit.  Please contact the clinic ([phone]) at least 24 hours prior in the event you need to cancel.  2.  Do no harm to yourself or others.    3.  Avoid alcohol and drugs.    4.  Take all medications as prescribed.  Please contact the clinic with any concerns. If you are in need of medication refills, please call the clinic at [phone].    5. Should you want to get in touch with your provider, IAN Ford, please contact the office ([phone]), and staff will be able to page Jodi directly.  6. In the event you have personal crisis, contact the following crisis numbers: Suicide Prevention Hotline 1-638.639.6672; [facility] Helpline 2-293-837-HNQN; [facility] Emergency Room [phone]; text HELLO to 829415; or 684.     SPECIFIC RECOMMENDATIONS:     1.      Medications discussed at this encounter:     New Medications Ordered This Visit   Medications    ARIPiprazole (ABILIFY) 5 MG tablet     Sig: Take 1 tablet by mouth Daily.     Dispense:  90 tablet     Refill:  1    buPROPion XL (Wellbutrin XL) 150 MG 24 hr tablet     Sig: Take 1 tablet by mouth Every Morning.     Dispense:  90 tablet     Refill:  1    traZODone (DESYREL) 50 MG tablet     Sig: Take 1 tablet by mouth Every Night.     Dispense:  90 tablet     Refill:  1    Vortioxetine HBr (Trintellix) 20 MG tablet     Sig: Take 1 tablet by mouth Daily With Breakfast.     Dispense:  90 tablet     Refill:  1                       2.      Psychotherapy recommendations: We will continue therapy at future visits.     3.     Return to clinic: Return in about 6 months (around 8/17/2025).

## 2025-02-17 NOTE — PLAN OF CARE
CBT/Supportive  Allowed patient to process topics such as patient reports she has been  several times and they were routinely unsuccessful. Individual psychotherapy was provided utilizing solution focused techniques to restore adaptive functioning, provide symptom relief, discuss values and strengths, manage stress, identify triggers, recognize patterns of behavior, acknowledge sources of feelings and behaviors, assess symptoms, provide support, and discuss interpersonal conflicts. The therapeutic alliance was strengthened to encourage the patient to express their thoughts and feelings.     Psychotherapy time 30 minutes.  This time is exclusive to the therapy session and separate from the time spent on activities used to meet the criteria for the E/M service (history, exam, medical decision-making).  Goal is to strengthen defenses, promote problems solving, restore adaptive functioning, and provide symptom relief. Esteem building was enhanced through praise, reassurance, normalizing and encouragement. Coping skills were enhanced to build distress tolerance skills and emotional regulation. Allowed patient to freely discuss issues without interruption or judgement with unconditional positive regard, active listening skills, and empathy. Provided a safe, confidential environment to facilitate the development of a positive therapeutic relationship and encourage open, honest communication. Assisted patient in processing session content, acknowledged and normalized patient’s thoughts, feelings, and concerns by utilizing a person-centered approach in efforts to build appropriate rapport and a positive therapeutic relationship with open and honest communication. Plan to continue supportive psychotherapy in next appointment to provide symptom relief.
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
## 2025-02-17 NOTE — TREATMENT PLAN
Multi-Disciplinary Problems (from Behavioral Health Treatment Plan)      Active Problems       Problem: Anxiety  Start Date: 02/17/25      Problem Details: The patient self-scales this problem as a 5 with 10 being the worst.          Goal Priority Start Date Expected End Date End Date    Patient will develop and implement behavioral and cognitive strategies to reduce anxiety and irrational fears. -- 02/17/25 08/18/25 --    Goal Details: Functional status: Some mild symptoms or some difficulty in social, occupational, or school functioning, but generally functioning pretty well, has some meaningful interpersonal relationships (61-78)  Prognosis: Good chance of responding well to treatment   Progress: stable          Goal Intervention Frequency Start Date End Date    Help patient explore past emotional issues in relation to present anxiety. Q Month 02/17/25 --    Intervention Details: Duration of treatment until remission of symptoms.          Goal Intervention Frequency Start Date End Date    Help patient develop an awareness of their cognitive and physical responses to anxiety. Q Month 02/17/25 --    Intervention Details: Duration of treatment until remission of symptoms.                          Reviewed By       Jodi Nair APRN 02/17/25 1046                     I have discussed and reviewed this treatment plan with the patient.

## 2025-02-27 ENCOUNTER — OFFICE VISIT (OUTPATIENT)
Dept: INTERNAL MEDICINE | Facility: CLINIC | Age: 75
End: 2025-02-27
Payer: MEDICARE

## 2025-02-27 VITALS
OXYGEN SATURATION: 95 % | WEIGHT: 168 LBS | HEART RATE: 69 BPM | TEMPERATURE: 98.9 F | DIASTOLIC BLOOD PRESSURE: 92 MMHG | SYSTOLIC BLOOD PRESSURE: 138 MMHG | HEIGHT: 67 IN | BODY MASS INDEX: 26.37 KG/M2

## 2025-02-27 DIAGNOSIS — G47.00 INSOMNIA, UNSPECIFIED TYPE: ICD-10-CM

## 2025-02-27 DIAGNOSIS — M25.511 CHRONIC RIGHT SHOULDER PAIN: ICD-10-CM

## 2025-02-27 DIAGNOSIS — E03.9 HYPOTHYROIDISM, UNSPECIFIED TYPE: ICD-10-CM

## 2025-02-27 DIAGNOSIS — N32.81 OVERACTIVE BLADDER: ICD-10-CM

## 2025-02-27 DIAGNOSIS — G89.29 CHRONIC RIGHT SHOULDER PAIN: ICD-10-CM

## 2025-02-27 DIAGNOSIS — F33.1 MAJOR DEPRESSIVE DISORDER, RECURRENT EPISODE, MODERATE DEGREE: ICD-10-CM

## 2025-02-27 DIAGNOSIS — E66.3 OVERWEIGHT WITH BODY MASS INDEX (BMI) OF 25 TO 25.9 IN ADULT: ICD-10-CM

## 2025-02-27 DIAGNOSIS — I10 PRIMARY HYPERTENSION: ICD-10-CM

## 2025-02-27 DIAGNOSIS — Z00.00 MEDICARE ANNUAL WELLNESS VISIT, SUBSEQUENT: Primary | ICD-10-CM

## 2025-02-27 DIAGNOSIS — E78.5 DYSLIPIDEMIA: ICD-10-CM

## 2025-02-27 LAB
ALBUMIN SERPL-MCNC: 4.4 G/DL (ref 3.5–5.2)
ALBUMIN/GLOB SERPL: 1.5 G/DL
ALP SERPL-CCNC: 56 U/L (ref 39–117)
ALT SERPL W P-5'-P-CCNC: 17 U/L (ref 1–33)
ANION GAP SERPL CALCULATED.3IONS-SCNC: 9.2 MMOL/L (ref 5–15)
AST SERPL-CCNC: 19 U/L (ref 1–32)
BASOPHILS # BLD AUTO: 0.07 10*3/MM3 (ref 0–0.2)
BASOPHILS NFR BLD AUTO: 1.3 % (ref 0–1.5)
BILIRUB SERPL-MCNC: 0.3 MG/DL (ref 0–1.2)
BUN SERPL-MCNC: 10 MG/DL (ref 8–23)
BUN/CREAT SERPL: 11.6 (ref 7–25)
CALCIUM SPEC-SCNC: 9.9 MG/DL (ref 8.6–10.5)
CHLORIDE SERPL-SCNC: 104 MMOL/L (ref 98–107)
CHOLEST SERPL-MCNC: 182 MG/DL (ref 0–200)
CO2 SERPL-SCNC: 25.8 MMOL/L (ref 22–29)
CREAT SERPL-MCNC: 0.86 MG/DL (ref 0.57–1)
DEPRECATED RDW RBC AUTO: 41.1 FL (ref 37–54)
EGFRCR SERPLBLD CKD-EPI 2021: 71 ML/MIN/1.73
EOSINOPHIL # BLD AUTO: 0.2 10*3/MM3 (ref 0–0.4)
EOSINOPHIL NFR BLD AUTO: 3.8 % (ref 0.3–6.2)
ERYTHROCYTE [DISTWIDTH] IN BLOOD BY AUTOMATED COUNT: 11.9 % (ref 12.3–15.4)
GLOBULIN UR ELPH-MCNC: 2.9 GM/DL
GLUCOSE SERPL-MCNC: 91 MG/DL (ref 65–99)
HCT VFR BLD AUTO: 45.4 % (ref 34–46.6)
HDLC SERPL-MCNC: 57 MG/DL (ref 40–60)
HGB BLD-MCNC: 14.1 G/DL (ref 12–15.9)
IMM GRANULOCYTES # BLD AUTO: 0.02 10*3/MM3 (ref 0–0.05)
IMM GRANULOCYTES NFR BLD AUTO: 0.4 % (ref 0–0.5)
LDLC SERPL CALC-MCNC: 107 MG/DL (ref 0–100)
LDLC/HDLC SERPL: 1.84 {RATIO}
LYMPHOCYTES # BLD AUTO: 1.41 10*3/MM3 (ref 0.7–3.1)
LYMPHOCYTES NFR BLD AUTO: 27.1 % (ref 19.6–45.3)
MCH RBC QN AUTO: 28.6 PG (ref 26.6–33)
MCHC RBC AUTO-ENTMCNC: 31.1 G/DL (ref 31.5–35.7)
MCV RBC AUTO: 92.1 FL (ref 79–97)
MONOCYTES # BLD AUTO: 0.42 10*3/MM3 (ref 0.1–0.9)
MONOCYTES NFR BLD AUTO: 8.1 % (ref 5–12)
NEUTROPHILS NFR BLD AUTO: 3.09 10*3/MM3 (ref 1.7–7)
NEUTROPHILS NFR BLD AUTO: 59.3 % (ref 42.7–76)
NRBC BLD AUTO-RTO: 0 /100 WBC (ref 0–0.2)
PLATELET # BLD AUTO: 388 10*3/MM3 (ref 140–450)
PMV BLD AUTO: 9.6 FL (ref 6–12)
POTASSIUM SERPL-SCNC: 4.2 MMOL/L (ref 3.5–5.2)
PROT SERPL-MCNC: 7.3 G/DL (ref 6–8.5)
RBC # BLD AUTO: 4.93 10*6/MM3 (ref 3.77–5.28)
SODIUM SERPL-SCNC: 139 MMOL/L (ref 136–145)
T4 FREE SERPL-MCNC: 1.42 NG/DL (ref 0.92–1.68)
TRIGL SERPL-MCNC: 101 MG/DL (ref 0–150)
TSH SERPL DL<=0.05 MIU/L-ACNC: 8.04 UIU/ML (ref 0.27–4.2)
VLDLC SERPL-MCNC: 18 MG/DL (ref 5–40)
WBC NRBC COR # BLD AUTO: 5.21 10*3/MM3 (ref 3.4–10.8)

## 2025-02-27 PROCEDURE — 80053 COMPREHEN METABOLIC PANEL: CPT | Performed by: NURSE PRACTITIONER

## 2025-02-27 PROCEDURE — 84443 ASSAY THYROID STIM HORMONE: CPT | Performed by: NURSE PRACTITIONER

## 2025-02-27 PROCEDURE — 85025 COMPLETE CBC W/AUTO DIFF WBC: CPT | Performed by: NURSE PRACTITIONER

## 2025-02-27 PROCEDURE — 84439 ASSAY OF FREE THYROXINE: CPT | Performed by: NURSE PRACTITIONER

## 2025-02-27 PROCEDURE — 80061 LIPID PANEL: CPT | Performed by: NURSE PRACTITIONER

## 2025-02-27 NOTE — ASSESSMENT & PLAN NOTE
Patient's (Body mass index is 26.3 kg/m².) indicates that they are overweight with health conditions that include none . Weight is unchanged. BMI is above average; BMI management plan is completed. We discussed portion control and increasing exercise.     Orders:    Comprehensive Metabolic Panel    CBC & Differential    TSH    Lipid Panel

## 2025-02-27 NOTE — ASSESSMENT & PLAN NOTE
Hypertension is stable and controlled  Continue current treatment regimen.  Blood pressure will be reassessed in 6 months.    Orders:    Comprehensive Metabolic Panel    CBC & Differential

## 2025-02-27 NOTE — ASSESSMENT & PLAN NOTE
Labs today, on statin, will adjust medication based on results, she does report good compliance, however looking at refills, this could be question.  Orders:    Lipid Panel

## 2025-02-27 NOTE — ASSESSMENT & PLAN NOTE
Patient's depression is a single episode that is mild without psychosis. Depression is in full remission and stable.    Plan:   Continue current medication therapy     Followup in 6 months.

## 2025-02-27 NOTE — PROGRESS NOTES
Subjective   The ABCs of the Annual Wellness Visit  Medicare Wellness Visit      Gregoria Claire is a 74 y.o. patient who presents for a Medicare Wellness Visit.    The following portions of the patient's history were reviewed and   updated as appropriate: allergies, current medications, past family history, past medical history, past social history, past surgical history, and problem list.    Compared to one year ago, the patient's physical   health is the same.  Compared to one year ago, the patient's mental   health is the same.    Recent Hospitalizations:  She was not admitted to the hospital during the last year.     Current Medical Providers:  Patient Care Team:  Reggie Stockton APRN as PCP - General (Internal Medicine)  Autumn Burger APRN as Nurse Practitioner (Urology)    Outpatient Medications Prior to Visit   Medication Sig Dispense Refill    ARIPiprazole (ABILIFY) 5 MG tablet Take 1 tablet by mouth Daily. 90 tablet 1    atorvastatin (LIPITOR) 10 MG tablet TAKE ONE TABLET BY MOUTH EVERY DAY 90 tablet 0    buPROPion XL (Wellbutrin XL) 150 MG 24 hr tablet Take 1 tablet by mouth Every Morning. 90 tablet 1    estradiol (ESTRACE) 0.1 MG/GM vaginal cream Using applicator, insert 1 gram into the vagina 3 (three) times a week at bedtme      levothyroxine (SYNTHROID, LEVOTHROID) 150 MCG tablet Take 1 tablet by mouth Every Morning. 180 tablet 0    nitrofurantoin, macrocrystal-monohydrate, (MACROBID) 100 MG capsule Take 1 capsule by mouth Every 12 (Twelve) Hours.      traZODone (DESYREL) 50 MG tablet Take 1 tablet by mouth Every Night. 90 tablet 1    Vibegron 75 MG tablet Take 1 tablet by mouth Daily for 360 days. 90 tablet 3    Vortioxetine HBr (Trintellix) 20 MG tablet Take 1 tablet by mouth Daily With Breakfast. 90 tablet 1     No facility-administered medications prior to visit.     No opioid medication identified on active medication list. I have reviewed chart for other potential  high risk  "medication/s and harmful drug interactions in the elderly.      Aspirin is not on active medication list.  Aspirin use is not indicated based on review of current medical condition/s. Risk of harm outweighs potential benefits.  .    Patient Active Problem List   Diagnosis    History of breast cancer    Primary hypertension    Hypothyroidism    Palpitations    Recent head trauma    Syncope    Major depressive disorder, recurrent episode, moderate degree    Dyslipidemia    Overactive bladder    Insomnia    Overweight with body mass index (BMI) of 25 to 25.9 in adult    Medicare annual wellness visit, subsequent    Excessive daytime sleepiness     Advance Care Planning Advance Directive is not on file.  ACP discussion was held with the patient during this visit. Patient does not have an advance directive, information provided.            Objective   Vitals:    02/27/25 1504   BP: 138/92   BP Location: Left arm   Patient Position: Sitting   Cuff Size: Adult   Pulse: 69   Temp: 98.9 °F (37.2 °C)   TempSrc: Temporal   SpO2: 95%   Weight: 76.2 kg (168 lb)   Height: 170.2 cm (67.01\")       Estimated body mass index is 26.3 kg/m² as calculated from the following:    Height as of this encounter: 170.2 cm (67.01\").    Weight as of this encounter: 76.2 kg (168 lb).    BMI is >= 25 and <30. (Overweight) The following options were offered after discussion;: exercise counseling/recommendations and nutrition counseling/recommendations           Does the patient have evidence of cognitive impairment? No                                                                                                Health  Risk Assessment    Smoking Status:  Social History     Tobacco Use   Smoking Status Never    Passive exposure: Never   Smokeless Tobacco Never     Alcohol Consumption:  Social History     Substance and Sexual Activity   Alcohol Use Never       Fall Risk Screen  STEADI Fall Risk Assessment was completed, and patient is at LOW risk " for falls.Assessment completed on:2025    Depression Screening   Little interest or pleasure in doing things? Not at all   Feeling down, depressed, or hopeless? Not at all   PHQ-2 Total Score 0      Health Habits and Functional and Cognitive Screenin/27/2025     3:58 PM   Functional & Cognitive Status   Do you have difficulty preparing food and eating? No   Do you have difficulty bathing yourself, getting dressed or grooming yourself? No   Do you have difficulty using the toilet? No   Do you have difficulty moving around from place to place? No   Do you have trouble with steps or getting out of a bed or a chair? No   Current Diet Well Balanced Diet   Dental Exam Up to date   Eye Exam Up to date   Exercise (times per week) 2 times per week   Current Exercises Include Aerobics   Do you need help using the phone?  No   Are you deaf or do you have serious difficulty hearing?  No   Do you need help to go to places out of walking distance? No   Do you need help shopping? No   Do you need help preparing meals?  No   Do you need help with housework?  No   Do you need help with laundry? No   Do you need help taking your medications? No   Do you need help managing money? No   Do you ever drive or ride in a car without wearing a seat belt? No   Have you felt unusual stress, anger or loneliness in the last month? No   Who do you live with? Alone   If you need help, do you have trouble finding someone available to you? No   Have you been bothered in the last four weeks by sexual problems? No   Do you have difficulty concentrating, remembering or making decisions? No           Age-appropriate Screening Schedule:  Refer to the list below for future screening recommendations based on patient's age, sex and/or medical conditions. Orders for these recommended tests are listed in the plan section. The patient has been provided with a written plan.    Health Maintenance List  Health Maintenance   Topic Date Due    DXA  "SCAN  01/10/2020    COLORECTAL CANCER SCREENING  07/06/2023    ANNUAL WELLNESS VISIT  11/06/2024    ZOSTER VACCINE (1 of 2) 02/27/2025 (Originally 7/17/2000)    COVID-19 Vaccine (4 - 2024-25 season) 03/01/2025 (Originally 9/1/2024)    INFLUENZA VACCINE  03/31/2025 (Originally 7/1/2024)    TDAP/TD VACCINES (1 - Tdap) 02/27/2026 (Originally 7/17/1969)    BMI FOLLOWUP  02/27/2026    MAMMOGRAM  08/05/2026    HEPATITIS C SCREENING  Completed    Pneumococcal Vaccine 50+  Completed                                                                                                                                                CMS Preventative Services Quick Reference  Risk Factors Identified During Encounter  None Identified    The above risks/problems have been discussed with the patient.  Pertinent information has been shared with the patient in the After Visit Summary.  An After Visit Summary and PPPS were made available to the patient.    Follow Up:   Next Medicare Wellness visit to be scheduled in 1 year.         Additional E&M Note during same encounter follows:  Patient has additional, significant, and separately identifiable condition(s)/problem(s) that require work above and beyond the Medicare Wellness Visit     Chief Complaint  Thyroid Problem    Subjective   HPI  Gregoria is also being seen today for additional medical problem/s.    Following up on chronic conditions including blood pressure, which is well-controlled, dyslipidemia, due for recheck, on statin.  Overactive bladder, on medication, denies any significant issues.    Chronic right shoulder pain, which she would like to have referral.  Weight, which has been stable.  Depression, insomnia, working with mental health provider, medications have been working well.        Objective   Vital Signs:  /92 (BP Location: Left arm, Patient Position: Sitting, Cuff Size: Adult)   Pulse 69   Temp 98.9 °F (37.2 °C) (Temporal)   Ht 170.2 cm (67.01\")   Wt 76.2 kg " (168 lb)   SpO2 95%   BMI 26.30 kg/m²   Physical Exam  Vitals and nursing note reviewed.   Constitutional:       Appearance: Normal appearance.   HENT:      Head: Normocephalic and atraumatic.   Cardiovascular:      Rate and Rhythm: Normal rate and regular rhythm.   Pulmonary:      Effort: Pulmonary effort is normal.      Breath sounds: Normal breath sounds.   Neurological:      Mental Status: She is alert.   Psychiatric:         Mood and Affect: Mood normal.         Thought Content: Thought content normal.                       Assessment and Plan            Medicare annual wellness visit, subsequent  Screening labs reviewed/ordered  Counseling provided regarding age appropriate screenings and immunizations, healthy diet and exercise.     Orders:    Comprehensive Metabolic Panel    CBC & Differential    TSH    Lipid Panel    Primary hypertension  Hypertension is stable and controlled  Continue current treatment regimen.  Blood pressure will be reassessed in 6 months.    Orders:    Comprehensive Metabolic Panel    CBC & Differential    Hypothyroidism, unspecified type  Will check labs today, will refill medication if continues to be normal, no recent adjustments otherwise.  Orders:    TSH    T4, Free    Major depressive disorder, recurrent episode, moderate degree  Patient's depression is a single episode that is mild without psychosis. Depression is in full remission and stable.    Plan:   Continue current medication therapy     Followup in 6 months.          Dyslipidemia  Labs today, on statin, will adjust medication based on results, she does report good compliance, however looking at refills, this could be question.  Orders:    Lipid Panel    Overactive bladder  Medications continued to work okay, has worked with urology previously.  No concerns today.       Insomnia, unspecified type  Working with mental health provider, sleeping okay, denies any issues today.       Overweight with body mass index (BMI) of 25  to 25.9 in adult  Patient's (Body mass index is 26.3 kg/m².) indicates that they are overweight with health conditions that include none . Weight is unchanged. BMI is above average; BMI management plan is completed. We discussed portion control and increasing exercise.     Orders:    Comprehensive Metabolic Panel    CBC & Differential    TSH    Lipid Panel    Chronic right shoulder pain  No reported injury, deferred any x-rays today, she requested to see orthopedic provider to further evaluate.  Orders:    Ambulatory Referral to Orthopedic Surgery            Follow Up   Return in about 6 months (around 8/27/2025) for Recheck.  Patient was given instructions and counseling regarding her condition or for health maintenance advice. Please see specific information pulled into the AVS if appropriate.

## 2025-02-27 NOTE — ASSESSMENT & PLAN NOTE
Will check labs today, will refill medication if continues to be normal, no recent adjustments otherwise.  Orders:    TSH    T4, Free

## 2025-03-03 DIAGNOSIS — Z12.12 ENCOUNTER FOR COLORECTAL CANCER SCREENING: Primary | ICD-10-CM

## 2025-03-03 DIAGNOSIS — Z12.11 ENCOUNTER FOR COLORECTAL CANCER SCREENING: Primary | ICD-10-CM

## 2025-03-05 ENCOUNTER — OFFICE VISIT (OUTPATIENT)
Dept: ORTHOPEDIC SURGERY | Facility: CLINIC | Age: 75
End: 2025-03-05
Payer: MEDICARE

## 2025-03-05 VITALS
BODY MASS INDEX: 26.37 KG/M2 | WEIGHT: 168 LBS | HEART RATE: 82 BPM | HEIGHT: 67 IN | SYSTOLIC BLOOD PRESSURE: 113 MMHG | DIASTOLIC BLOOD PRESSURE: 75 MMHG | OXYGEN SATURATION: 93 %

## 2025-03-05 DIAGNOSIS — M75.41 IMPINGEMENT SYNDROME OF RIGHT SHOULDER: ICD-10-CM

## 2025-03-05 DIAGNOSIS — M25.511 RIGHT SHOULDER PAIN, UNSPECIFIED CHRONICITY: Primary | ICD-10-CM

## 2025-03-05 DIAGNOSIS — M19.011 PRIMARY OSTEOARTHRITIS OF RIGHT SHOULDER: ICD-10-CM

## 2025-03-05 RX ORDER — TRIAMCINOLONE ACETONIDE 40 MG/ML
40 INJECTION, SUSPENSION INTRA-ARTICULAR; INTRAMUSCULAR
Status: COMPLETED | OUTPATIENT
Start: 2025-03-05 | End: 2025-03-05

## 2025-03-05 RX ORDER — LIDOCAINE HYDROCHLORIDE 10 MG/ML
5 INJECTION, SOLUTION INFILTRATION; PERINEURAL
Status: COMPLETED | OUTPATIENT
Start: 2025-03-05 | End: 2025-03-05

## 2025-03-05 RX ADMIN — LIDOCAINE HYDROCHLORIDE 5 ML: 10 INJECTION, SOLUTION INFILTRATION; PERINEURAL at 12:21

## 2025-03-05 RX ADMIN — TRIAMCINOLONE ACETONIDE 40 MG: 40 INJECTION, SUSPENSION INTRA-ARTICULAR; INTRAMUSCULAR at 12:21

## 2025-03-05 NOTE — PROGRESS NOTES
"Chief Complaint  Initial Evaluation of the Right Shoulder    Subjective          Gregoria Claire presents to [facility] for an evaluation  of her right shoulder.     History of Present Illness    The patient presents here today for an evaluation  of her right shoulder. Her right shoulder has been bothering her for several months but denies any specific injury, trauma, falls or prior surgery. She has pain with range of motion and reaching. She has had prior rotator cuff repair on her left shoulder years ago.     No Known Allergies     Social History     Socioeconomic History    Marital status:    Tobacco Use    Smoking status: Never     Passive exposure: Never    Smokeless tobacco: Never   Vaping Use    Vaping status: Never Used   Substance and Sexual Activity    Alcohol use: Never    Drug use: Never    Sexual activity: Defer        I reviewed the patient's chief complaint, history of present illness, review of systems, past medical history, surgical history, family history, social history, medications, and allergy list.     REVIEW OF SYSTEMS    Constitutional: Denies fevers, chills, weight loss  Cardiovascular: Denies chest pain, shortness of breath  Skin: Denies rashes, acute skin changes  Neurologic: Denies headache, loss of consciousness  MSK: right shoulder pain       Objective   Vital Signs:   /75   Pulse 82   Ht 170.2 cm (67.01\")   Wt 76.2 kg (168 lb)   SpO2 93%   BMI 26.30 kg/m²     Body mass index is 26.3 kg/m².    Physical Exam    General: Alert. No acute distress.   Right upper extremity:  forward elevation  to 90 degrees with pain, external rotation  at the side to 30 degrees with pain, internal rotation to lateral  hip, 5/5 rotator cuff strength with pain, pain with cross arm abduction, mild pain with impingement, neurovascularly intact, sensation intact to the medial, radial and ulnar nerve, positive  pulses.     Large Joint Arthrocentesis: R subacromial " bursa  Date/Time: 3/5/2025 12:21 PM  Consent given by: patient  Site marked: site marked  Timeout: Immediately prior to procedure a time out was called to verify the correct patient, procedure, equipment, support staff and site/side marked as required   Supporting Documentation  Indications: pain   Procedure Details  Location: shoulder - R subacromial bursa  Preparation: Patient was prepped and draped in the usual sterile fashion  Needle gauge: 21 G.  Medications administered: 5 mL lidocaine 1 %; 40 mg triamcinolone acetonide 40 MG/ML  Patient tolerance: patient tolerated the procedure well with no immediate complications      This injection documentation was Scribed for Wild Lai MD by GOMEZ Mckeon.  03/05/25   12:21 EST    Imaging Results (Most Recent)       Procedure Component Value Units Date/Time    XR Shoulder 2+ View Right [[id number]] Resulted: 03/05/25 1247     Updated: 03/05/25 1247    Narrative:      Indications: Right shoulder pain    Views: AP, Grashey, Scap Y, axillary right shoulder    Findings: No fractures noted.  Mild degenerative changes are present.    Sclerotic changes of the greater tuberosity.  Glenohumeral joint reduced.    Comparative Data: No comparative data available                     Assessment and Plan        XR Shoulder 2+ View Right    Result Date: 3/5/2025  Narrative: Indications: Right shoulder pain Views: AP, Grashey, Scap Y, axillary right shoulder Findings: No fractures noted.  Mild degenerative changes are present.  Sclerotic changes of the greater tuberosity.  Glenohumeral joint reduced. Comparative Data: No comparative data available      Diagnoses and all orders for this visit:    1. Right shoulder pain, unspecified chronicity (Primary)  -     XR Shoulder 2+ View Right    2. Primary osteoarthritis of right shoulder    3. Impingement syndrome of right shoulder    Other orders  -     Large Joint Arthrocentesis: R subacromial bursa      The patient presents here  today for an evaluation of her right shoulder. X-rays were obtained in the office today and these were reviewed today.     Discussed the risks and benefits of a right shoulder steroid injection today in the office. Patient expressed understanding and wishes to proceed. Patient tolerted the injection well and without any complications.     Home exercises given today and will continue over the counter medications for pain control.     May consider an MRI in the future if symptoms persist.      Call or return if worsening symptoms.    Scribed for Wild Lai MD by Sally Gupta  03/05/2025   11:16 EST       Follow Up     6 weeks     Patient was given instructions and counseling regarding her condition or for health maintenance advice. Please see specific information pulled into the AVS if appropriate.     I have personally performed the services described in this document as scribed by the above individual and it is both accurate and complete. Wild Lai MD 03/05/25 13:00 EST

## 2025-03-21 RX ORDER — ATORVASTATIN CALCIUM 10 MG/1
10 TABLET, FILM COATED ORAL DAILY
Qty: 90 TABLET | Refills: 0 | Status: SHIPPED | OUTPATIENT
Start: 2025-03-21

## 2025-03-21 NOTE — TELEPHONE ENCOUNTER
Caller: Gregoria Claire    Relationship: Self    Best call back number: [phone]    Requested Prescriptions:   Requested Prescriptions     Pending Prescriptions Disp Refills    atorvastatin (LIPITOR) 10 MG tablet 90 tablet 0     Sig: Take 1 tablet by mouth Daily.    levothyroxine (SYNTHROID, LEVOTHROID) 150 MCG tablet 180 tablet 0     Sig: Take 1 tablet by mouth Every Morning.        Pharmacy where request should be sent: [facility] - [place], [place] - [address] [facility] [phone] [facility] [phone]      Last office visit with prescribing clinician: 2/27/2025   Last telemedicine visit with prescribing clinician: Visit date not found   Next office visit with prescribing clinician: Visit date not found       Does the patient have less than a 3 day supply:  [x] Yes  [] No    Would you like a call back once the refill request has been completed: [] Yes [x] No    If the office needs to give you a call back, can they leave a voicemail: [] Yes [x] No    Lino Borges Rep   03/21/25 13:43 EDT

## 2025-03-24 RX ORDER — LEVOTHYROXINE SODIUM 150 UG/1
150 TABLET ORAL EVERY MORNING
Qty: 180 TABLET | Refills: 0 | Status: SHIPPED | OUTPATIENT
Start: 2025-03-24

## 2025-03-24 NOTE — TELEPHONE ENCOUNTER
Protocol failed       Thyroid Hormones Protocol Oijyeo1403/21/2025 01:44 PM   Protocol Details Normal TSH in past 12 months

## 2025-05-19 ENCOUNTER — OFFICE VISIT (OUTPATIENT)
Dept: INTERNAL MEDICINE | Facility: CLINIC | Age: 75
End: 2025-05-19
Payer: MEDICARE

## 2025-05-19 VITALS
HEART RATE: 89 BPM | DIASTOLIC BLOOD PRESSURE: 80 MMHG | OXYGEN SATURATION: 92 % | HEIGHT: 67 IN | RESPIRATION RATE: 12 BRPM | WEIGHT: 161.4 LBS | BODY MASS INDEX: 25.33 KG/M2 | SYSTOLIC BLOOD PRESSURE: 132 MMHG | TEMPERATURE: 96.7 F

## 2025-05-19 DIAGNOSIS — R19.7 FREQUENT DIARRHEA: ICD-10-CM

## 2025-05-19 DIAGNOSIS — R79.89 ELEVATED TSH: Primary | ICD-10-CM

## 2025-05-19 DIAGNOSIS — E66.3 OVERWEIGHT WITH BODY MASS INDEX (BMI) OF 25 TO 25.9 IN ADULT: ICD-10-CM

## 2025-05-19 DIAGNOSIS — E03.9 HYPOTHYROIDISM, UNSPECIFIED TYPE: ICD-10-CM

## 2025-05-19 DIAGNOSIS — F33.1 MAJOR DEPRESSIVE DISORDER, RECURRENT EPISODE, MODERATE DEGREE: ICD-10-CM

## 2025-05-19 DIAGNOSIS — G47.00 INSOMNIA, UNSPECIFIED TYPE: ICD-10-CM

## 2025-05-19 LAB
BASOPHILS # BLD AUTO: 0.09 10*3/MM3 (ref 0–0.2)
BASOPHILS NFR BLD AUTO: 1.4 % (ref 0–1.5)
DEPRECATED RDW RBC AUTO: 39.9 FL (ref 37–54)
EOSINOPHIL # BLD AUTO: 0.17 10*3/MM3 (ref 0–0.4)
EOSINOPHIL NFR BLD AUTO: 2.6 % (ref 0.3–6.2)
ERYTHROCYTE [DISTWIDTH] IN BLOOD BY AUTOMATED COUNT: 12.2 % (ref 12.3–15.4)
HCT VFR BLD AUTO: 42.4 % (ref 34–46.6)
HGB BLD-MCNC: 14.1 G/DL (ref 12–15.9)
IMM GRANULOCYTES # BLD AUTO: 0.03 10*3/MM3 (ref 0–0.05)
IMM GRANULOCYTES NFR BLD AUTO: 0.5 % (ref 0–0.5)
LYMPHOCYTES # BLD AUTO: 1.35 10*3/MM3 (ref 0.7–3.1)
LYMPHOCYTES NFR BLD AUTO: 20.5 % (ref 19.6–45.3)
MCH RBC QN AUTO: 29.6 PG (ref 26.6–33)
MCHC RBC AUTO-ENTMCNC: 33.3 G/DL (ref 31.5–35.7)
MCV RBC AUTO: 88.9 FL (ref 79–97)
MONOCYTES # BLD AUTO: 0.49 10*3/MM3 (ref 0.1–0.9)
MONOCYTES NFR BLD AUTO: 7.4 % (ref 5–12)
NEUTROPHILS NFR BLD AUTO: 4.46 10*3/MM3 (ref 1.7–7)
NEUTROPHILS NFR BLD AUTO: 67.6 % (ref 42.7–76)
NRBC BLD AUTO-RTO: 0 /100 WBC (ref 0–0.2)
PLATELET # BLD AUTO: 399 10*3/MM3 (ref 140–450)
PMV BLD AUTO: 9.6 FL (ref 6–12)
RBC # BLD AUTO: 4.77 10*6/MM3 (ref 3.77–5.28)
WBC NRBC COR # BLD AUTO: 6.59 10*3/MM3 (ref 3.4–10.8)

## 2025-05-19 PROCEDURE — 84443 ASSAY THYROID STIM HORMONE: CPT | Performed by: NURSE PRACTITIONER

## 2025-05-19 PROCEDURE — 84439 ASSAY OF FREE THYROXINE: CPT | Performed by: NURSE PRACTITIONER

## 2025-05-19 PROCEDURE — 85025 COMPLETE CBC W/AUTO DIFF WBC: CPT | Performed by: NURSE PRACTITIONER

## 2025-05-19 PROCEDURE — 80053 COMPREHEN METABOLIC PANEL: CPT | Performed by: NURSE PRACTITIONER

## 2025-05-19 NOTE — PROGRESS NOTES
"Chief Complaint  Thyroid (Thyroid check )    Subjective        Gregoria Claire presents to [facility] for follow-up regarding thyroid and other concerns.  Patient due for thyroid level check.  Her last check 3 months ago her TSH was elevated, T4 was normal.  She was not experiencing any symptoms, but recommended to recheck.  Denies any symptoms today.  Taking medication with good compliance.    She does report some diarrhea, however this is something that she states has been going on for over a year.  She reports bringing this up before, however I do not see any substantial documentation on this in the last 18 months or so.  She reports that throughout the week, she will have 2 to 4 days of diarrhea, this is usually 2-3 episodes of diarrhea on those days.  On the days that she does not have diarrhea, she will either go without a bowel movement or she will go with a normal bowel movement, about once a day.  She denies any other significant symptoms.  She has not had any unexplained weight loss, she has been trying to lose weight, and over the last 5 or 6 months, she has lost about 10 pounds.  She does not feel like the symptoms are worsening, but they have been persistent over the last year or more.    Mental health, she reports that this is improving.  She is working with mental health provider, they have been working on medication adjustments and she feels like these are working fairly well.    Objective   Vital Signs:   /80 (BP Location: Left arm, Patient Position: Sitting, Cuff Size: Adult)   Pulse 89   Temp 96.7 °F (35.9 °C) (Temporal)   Resp 12   Ht 170.2 cm (67.01\")   Wt 73.2 kg (161 lb 6.4 oz)   SpO2 92%   BMI 25.27 kg/m²     Physical Exam  Vitals and nursing note reviewed.   Constitutional:       Appearance: Normal appearance.   HENT:      Head: Normocephalic and atraumatic.   Cardiovascular:      Rate and Rhythm: Normal rate and regular rhythm.   Pulmonary:      " Effort: Pulmonary effort is normal.      Breath sounds: Normal breath sounds.   Musculoskeletal:      Cervical back: Normal range of motion and neck supple.   Neurological:      Mental Status: She is alert.   Psychiatric:         Mood and Affect: Mood normal.         Thought Content: Thought content normal.        Result Review :  {The following data was reviewed by IAN Thomson on 05/20/25                Diagnoses and all orders for this visit:    1. Elevated TSH (Primary)  -     TSH  -     T4, Free    2. Hypothyroidism, unspecified type  -     TSH  -     T4, Free    3. Frequent diarrhea  -     H. Pylori Antigen, Stool - Stool, Per Rectum; Future  -     Giardia Antigen - Stool, Per Rectum; Future  -     Fecal Leukocytes - Stool, Per Rectum; Future  -     Stool Culture (Reference Lab) - Stool, Per Rectum; Future  -     CBC & Differential  -     Comprehensive Metabolic Panel    4. Major depressive disorder, recurrent episode, moderate degree    5. Insomnia, unspecified type    6. Overweight with body mass index (BMI) of 25 to 25.9 in adult    Will check patient's thyroid levels as mentioned, TSH was previously a little elevated, not enough to affect her T4, but will recheck and guide treatment based on those results.    Her diarrhea, vague, nonspecific, unclear as to the actual timeline, but we will check stool studies, CBC, metabolic panel and guide treatment based on these results.    Her mental health is improving, which I recommend she continue with her current treatment, medications were reviewed today.    Weight, she has been losing weight at a reasonable pace, nothing too concerning.  Continue to monitor diet, regular activity as tolerated.      Follow Up   No follow-ups on file.  Patient was given instructions and counseling regarding her condition or for health maintenance advice. Please see specific information pulled into the AVS if appropriate.     IAN Thomson  5/20/2025  This note was  electronically signed.

## 2025-05-20 LAB
ALBUMIN SERPL-MCNC: 4.5 G/DL (ref 3.5–5.2)
ALBUMIN/GLOB SERPL: 1.6 G/DL
ALP SERPL-CCNC: 49 U/L (ref 39–117)
ALT SERPL W P-5'-P-CCNC: 8 U/L (ref 1–33)
ANION GAP SERPL CALCULATED.3IONS-SCNC: 16 MMOL/L (ref 5–15)
AST SERPL-CCNC: 26 U/L (ref 1–32)
BILIRUB SERPL-MCNC: 0.4 MG/DL (ref 0–1.2)
BUN SERPL-MCNC: 14 MG/DL (ref 8–23)
BUN/CREAT SERPL: 18.4 (ref 7–25)
CALCIUM SPEC-SCNC: 9.8 MG/DL (ref 8.6–10.5)
CHLORIDE SERPL-SCNC: 104 MMOL/L (ref 98–107)
CO2 SERPL-SCNC: 20 MMOL/L (ref 22–29)
CREAT SERPL-MCNC: 0.76 MG/DL (ref 0.57–1)
EGFRCR SERPLBLD CKD-EPI 2021: 82.3 ML/MIN/1.73
GLOBULIN UR ELPH-MCNC: 2.9 GM/DL
GLUCOSE SERPL-MCNC: 98 MG/DL (ref 65–99)
POTASSIUM SERPL-SCNC: 4 MMOL/L (ref 3.5–5.2)
PROT SERPL-MCNC: 7.4 G/DL (ref 6–8.5)
SODIUM SERPL-SCNC: 140 MMOL/L (ref 136–145)
T4 FREE SERPL-MCNC: 1.59 NG/DL (ref 0.92–1.68)
TSH SERPL DL<=0.05 MIU/L-ACNC: 10.2 UIU/ML (ref 0.27–4.2)

## 2025-05-27 ENCOUNTER — TELEPHONE (OUTPATIENT)
Dept: BEHAVIORAL HEALTH | Facility: CLINIC | Age: 75
End: 2025-05-27
Payer: MEDICARE

## 2025-05-27 DIAGNOSIS — F33.2 SEVERE EPISODE OF RECURRENT MAJOR DEPRESSIVE DISORDER, WITHOUT PSYCHOTIC FEATURES: ICD-10-CM

## 2025-05-27 DIAGNOSIS — F41.1 GENERALIZED ANXIETY DISORDER: ICD-10-CM

## 2025-05-27 DIAGNOSIS — R19.7 FREQUENT DIARRHEA: ICD-10-CM

## 2025-05-27 LAB — H. PYLORI ANTIGEN STOOL: NEGATIVE

## 2025-05-27 PROCEDURE — 87046 STOOL CULTR AEROBIC BACT EA: CPT | Performed by: NURSE PRACTITIONER

## 2025-05-27 PROCEDURE — 87045 FECES CULTURE AEROBIC BACT: CPT | Performed by: NURSE PRACTITIONER

## 2025-05-27 PROCEDURE — 87338 HPYLORI STOOL AG IA: CPT | Performed by: NURSE PRACTITIONER

## 2025-05-27 PROCEDURE — 87205 SMEAR GRAM STAIN: CPT | Performed by: NURSE PRACTITIONER

## 2025-05-27 PROCEDURE — 87427 SHIGA-LIKE TOXIN AG IA: CPT | Performed by: NURSE PRACTITIONER

## 2025-05-27 RX ORDER — VORTIOXETINE 20 MG/1
20 TABLET, FILM COATED ORAL
Qty: 90 TABLET | Refills: 1 | Status: SHIPPED | OUTPATIENT
Start: 2025-05-27

## 2025-05-27 NOTE — TELEPHONE ENCOUNTER
NEXT VISIT WITH PROVIDER Appointment with Jodi Nair APRN (08/11/2025)     LAST SEEN BY PROVIDER Office Visit with Jodi Nair APRN (02/17/2025)     LAST MED REFILLVortioxetine HBr (Trintellix) 20 MG tablet (02/17/2025)     PROVIDER PLEASE ADVISE

## 2025-06-01 LAB
BACTERIA SPEC CULT: NORMAL
BACTERIA SPEC CULT: NORMAL
CAMPYLOBACTER STL CULT: NORMAL
E COLI SXT STL QL IA: NEGATIVE
SALM + SHIG STL CULT: NORMAL

## 2025-06-02 ENCOUNTER — TELEPHONE (OUTPATIENT)
Dept: INTERNAL MEDICINE | Facility: CLINIC | Age: 75
End: 2025-06-02
Payer: MEDICARE

## 2025-06-02 NOTE — TELEPHONE ENCOUNTER
Caller: Gregoria Claire    Relationship: Self    Best call back number:     [phone]       What test was performed: STOOL SAMPLE    When was the test performed: 5.27.25    Where was the test performed: IN OFFICE LAB    Additional notes:

## 2025-06-02 NOTE — TELEPHONE ENCOUNTER
Spoke with patient and inform her provider has not review results yet and one of the test is still in process, we will call her once provider review results, pt verbalized understanding.

## 2025-06-05 LAB
WBC STL QL MICRO: NORMAL
WBC STL QL MICRO: NORMAL

## 2025-06-25 RX ORDER — LEVOTHYROXINE SODIUM 150 UG/1
150 TABLET ORAL EVERY MORNING
Qty: 180 TABLET | Refills: 0 | Status: SHIPPED | OUTPATIENT
Start: 2025-06-25

## 2025-06-25 NOTE — TELEPHONE ENCOUNTER
Caller: Gregoria Claire    Relationship: Self    Best call back number: [phone]    Requested Prescriptions:   Requested Prescriptions     Pending Prescriptions Disp Refills    levothyroxine (SYNTHROID, LEVOTHROID) 150 MCG tablet 180 tablet 0     Sig: Take 1 tablet by mouth Every Morning.        Pharmacy where request should be sent: [facility] - [place], [place] - [address] [facility] - [phone]  - [phone]     Last office visit with prescribing clinician: 5/19/2025   Last telemedicine visit with prescribing clinician: Visit date not found   Next office visit with prescribing clinician: Visit date not found     Does the patient have less than a 3 day supply:  [x] Yes  [] No    Would you like a call back once the refill request has been completed: [] Yes [x] No    If the office needs to give you a call back, can they leave a voicemail: [] Yes [x] No    Lino Hardwick   06/25/25 11:11 EDT

## 2025-06-26 RX ORDER — ATORVASTATIN CALCIUM 10 MG/1
10 TABLET, FILM COATED ORAL DAILY
Qty: 90 TABLET | Refills: 0 | Status: SHIPPED | OUTPATIENT
Start: 2025-06-26

## 2025-06-26 NOTE — TELEPHONE ENCOUNTER
Rx Refill Note  Requested Prescriptions     Pending Prescriptions Disp Refills    atorvastatin (LIPITOR) 10 MG tablet 90 tablet 0     Sig: Take 1 tablet by mouth Daily.      Last office visit with prescribing clinician: 5/19/2025   Last telemedicine visit with prescribing clinician: Visit date not found   Next office visit with prescribing clinician: Visit date not found                         Would you like a call back once the refill request has been completed: [] Yes [] No    If the office needs to give you a call back, can they leave a voicemail: [] Yes [] No    Pina Javed MA  06/26/25, 14:56 EDT

## 2025-08-11 ENCOUNTER — OFFICE VISIT (OUTPATIENT)
Dept: BEHAVIORAL HEALTH | Facility: CLINIC | Age: 75
End: 2025-08-11
Payer: MEDICARE

## 2025-08-11 VITALS
SYSTOLIC BLOOD PRESSURE: 144 MMHG | DIASTOLIC BLOOD PRESSURE: 70 MMHG | HEIGHT: 67 IN | BODY MASS INDEX: 25.11 KG/M2 | OXYGEN SATURATION: 89 % | HEART RATE: 73 BPM | WEIGHT: 160 LBS

## 2025-08-11 DIAGNOSIS — F33.2 SEVERE EPISODE OF RECURRENT MAJOR DEPRESSIVE DISORDER, WITHOUT PSYCHOTIC FEATURES: Primary | ICD-10-CM

## 2025-08-11 DIAGNOSIS — F41.1 GENERALIZED ANXIETY DISORDER: ICD-10-CM

## 2025-08-11 DIAGNOSIS — F51.01 PRIMARY INSOMNIA: ICD-10-CM

## 2025-08-11 RX ORDER — TRAZODONE HYDROCHLORIDE 100 MG/1
100 TABLET ORAL NIGHTLY
Qty: 30 TABLET | Refills: 1 | Status: SHIPPED | OUTPATIENT
Start: 2025-08-11

## 2025-08-11 RX ORDER — ARIPIPRAZOLE 5 MG/1
5 TABLET ORAL DAILY
Qty: 90 TABLET | Refills: 1 | Status: SHIPPED | OUTPATIENT
Start: 2025-08-11